# Patient Record
Sex: FEMALE | Race: WHITE | NOT HISPANIC OR LATINO
[De-identification: names, ages, dates, MRNs, and addresses within clinical notes are randomized per-mention and may not be internally consistent; named-entity substitution may affect disease eponyms.]

---

## 2018-02-15 PROBLEM — Z00.00 ENCOUNTER FOR PREVENTIVE HEALTH EXAMINATION: Status: ACTIVE | Noted: 2018-02-15

## 2018-03-20 ENCOUNTER — NON-APPOINTMENT (OUTPATIENT)
Age: 53
End: 2018-03-20

## 2018-03-20 ENCOUNTER — APPOINTMENT (OUTPATIENT)
Dept: ELECTROPHYSIOLOGY | Facility: CLINIC | Age: 53
End: 2018-03-20
Payer: COMMERCIAL

## 2018-03-20 VITALS
SYSTOLIC BLOOD PRESSURE: 104 MMHG | BODY MASS INDEX: 25.76 KG/M2 | OXYGEN SATURATION: 94 % | DIASTOLIC BLOOD PRESSURE: 71 MMHG | WEIGHT: 170 LBS | HEART RATE: 67 BPM | HEIGHT: 68 IN

## 2018-03-20 DIAGNOSIS — Z82.49 FAMILY HISTORY OF ISCHEMIC HEART DISEASE AND OTHER DISEASES OF THE CIRCULATORY SYSTEM: ICD-10-CM

## 2018-03-20 DIAGNOSIS — Z87.891 PERSONAL HISTORY OF NICOTINE DEPENDENCE: ICD-10-CM

## 2018-03-20 DIAGNOSIS — Z87.74 PERSONAL HISTORY OF (CORRECTED) CONGENITAL MALFORMATIONS OF HEART AND CIRCULATORY SYSTEM: ICD-10-CM

## 2018-03-20 DIAGNOSIS — I51.7 CARDIOMEGALY: ICD-10-CM

## 2018-03-20 PROCEDURE — 99244 OFF/OP CNSLTJ NEW/EST MOD 40: CPT

## 2018-03-20 PROCEDURE — 93000 ELECTROCARDIOGRAM COMPLETE: CPT

## 2018-03-27 ENCOUNTER — RX RENEWAL (OUTPATIENT)
Age: 53
End: 2018-03-27

## 2018-03-29 ENCOUNTER — RESULT REVIEW (OUTPATIENT)
Age: 53
End: 2018-03-29

## 2018-05-08 ENCOUNTER — APPOINTMENT (OUTPATIENT)
Dept: ELECTROPHYSIOLOGY | Facility: CLINIC | Age: 53
End: 2018-05-08
Payer: COMMERCIAL

## 2018-05-08 LAB
ALBUMIN SERPL ELPH-MCNC: 4.1 G/DL
ALP BLD-CCNC: 53 U/L
ALT SERPL-CCNC: 24 U/L
ANION GAP SERPL CALC-SCNC: 12 MMOL/L
APTT BLD: 35.6 SEC
AST SERPL-CCNC: 20 U/L
BASOPHILS # BLD AUTO: 0.02 K/UL
BASOPHILS NFR BLD AUTO: 0.4 %
BILIRUB SERPL-MCNC: 0.9 MG/DL
BUN SERPL-MCNC: 15 MG/DL
CALCIUM SERPL-MCNC: 9.6 MG/DL
CHLORIDE SERPL-SCNC: 105 MMOL/L
CO2 SERPL-SCNC: 23 MMOL/L
CREAT SERPL-MCNC: 0.79 MG/DL
EOSINOPHIL # BLD AUTO: 0.11 K/UL
EOSINOPHIL NFR BLD AUTO: 2 %
HCT VFR BLD CALC: 40.1 %
HGB BLD-MCNC: 13 G/DL
IMM GRANULOCYTES NFR BLD AUTO: 0.9 %
INR PPP: 1.44 RATIO
LYMPHOCYTES # BLD AUTO: 1.38 K/UL
LYMPHOCYTES NFR BLD AUTO: 25.4 %
MAN DIFF?: NORMAL
MCHC RBC-ENTMCNC: 30 PG
MCHC RBC-ENTMCNC: 32.4 GM/DL
MCV RBC AUTO: 92.6 FL
MONOCYTES # BLD AUTO: 0.48 K/UL
MONOCYTES NFR BLD AUTO: 8.8 %
NEUTROPHILS # BLD AUTO: 3.4 K/UL
NEUTROPHILS NFR BLD AUTO: 62.5 %
PLATELET # BLD AUTO: 213 K/UL
POTASSIUM SERPL-SCNC: 4.1 MMOL/L
PROT SERPL-MCNC: 6.8 G/DL
PT BLD: 16.4 SEC
RBC # BLD: 4.33 M/UL
RBC # FLD: 13.7 %
SODIUM SERPL-SCNC: 140 MMOL/L
WBC # FLD AUTO: 5.44 K/UL

## 2018-05-08 PROCEDURE — 36415 COLL VENOUS BLD VENIPUNCTURE: CPT

## 2018-05-17 ENCOUNTER — INPATIENT (INPATIENT)
Facility: HOSPITAL | Age: 53
LOS: 0 days | Discharge: HOME | End: 2018-05-18
Attending: INTERNAL MEDICINE | Admitting: INTERNAL MEDICINE
Payer: COMMERCIAL

## 2018-05-17 VITALS
HEART RATE: 61 BPM | WEIGHT: 184.53 LBS | RESPIRATION RATE: 17 BRPM | HEIGHT: 67 IN | DIASTOLIC BLOOD PRESSURE: 55 MMHG | OXYGEN SATURATION: 99 % | SYSTOLIC BLOOD PRESSURE: 90 MMHG

## 2018-05-17 DIAGNOSIS — Z29.9 ENCOUNTER FOR PROPHYLACTIC MEASURES, UNSPECIFIED: ICD-10-CM

## 2018-05-17 DIAGNOSIS — M50.20 OTHER CERVICAL DISC DISPLACEMENT, UNSPECIFIED CERVICAL REGION: ICD-10-CM

## 2018-05-17 DIAGNOSIS — Z95.0 PRESENCE OF CARDIAC PACEMAKER: Chronic | ICD-10-CM

## 2018-05-17 DIAGNOSIS — I48.0 PAROXYSMAL ATRIAL FIBRILLATION: ICD-10-CM

## 2018-05-17 DIAGNOSIS — D64.9 ANEMIA, UNSPECIFIED: ICD-10-CM

## 2018-05-17 DIAGNOSIS — I48.91 UNSPECIFIED ATRIAL FIBRILLATION: ICD-10-CM

## 2018-05-17 DIAGNOSIS — Z98.890 OTHER SPECIFIED POSTPROCEDURAL STATES: Chronic | ICD-10-CM

## 2018-05-17 LAB
APTT BLD: 64.3 SEC — HIGH (ref 27.5–37.4)
BASE EXCESS BLDA CALC-SCNC: -2.4 MMOL/L — SIGNIFICANT CHANGE UP
BLD GP AB SCN SERPL QL: NEGATIVE — SIGNIFICANT CHANGE UP
BUN SERPL-MCNC: 14 MG/DL — SIGNIFICANT CHANGE UP (ref 7–23)
CA-I BLDA-SCNC: 1.11 MMOL/L — LOW (ref 1.15–1.29)
CALCIUM SERPL-MCNC: 8.2 MG/DL — LOW (ref 8.4–10.5)
CHLORIDE SERPL-SCNC: 106 MMOL/L — SIGNIFICANT CHANGE UP (ref 98–107)
CO2 SERPL-SCNC: 23 MMOL/L — SIGNIFICANT CHANGE UP (ref 22–31)
CREAT SERPL-MCNC: 0.74 MG/DL — SIGNIFICANT CHANGE UP (ref 0.5–1.3)
GLUCOSE BLDA-MCNC: 144 MG/DL — HIGH (ref 70–99)
GLUCOSE SERPL-MCNC: 153 MG/DL — HIGH (ref 70–99)
HCG UR QL: NEGATIVE — SIGNIFICANT CHANGE UP
HCO3 BLDA-SCNC: 22 MMOL/L — SIGNIFICANT CHANGE UP (ref 22–26)
HCT VFR BLD CALC: 31.3 % — LOW (ref 34.5–45)
HCT VFR BLDA CALC: 33.2 % — LOW (ref 34.5–46.5)
HGB BLD-MCNC: 10.5 G/DL — LOW (ref 11.5–15.5)
HGB BLDA-MCNC: 10.8 G/DL — LOW (ref 11.5–15.5)
INR BLD: 1.1 — SIGNIFICANT CHANGE UP (ref 0.88–1.17)
MCHC RBC-ENTMCNC: 30.8 PG — SIGNIFICANT CHANGE UP (ref 27–34)
MCHC RBC-ENTMCNC: 33.5 % — SIGNIFICANT CHANGE UP (ref 32–36)
MCV RBC AUTO: 91.8 FL — SIGNIFICANT CHANGE UP (ref 80–100)
NRBC # FLD: 0 — SIGNIFICANT CHANGE UP
PCO2 BLDA: 40 MMHG — SIGNIFICANT CHANGE UP (ref 32–48)
PH BLDA: 7.36 PH — SIGNIFICANT CHANGE UP (ref 7.35–7.45)
PLATELET # BLD AUTO: 195 K/UL — SIGNIFICANT CHANGE UP (ref 150–400)
PMV BLD: 9.6 FL — SIGNIFICANT CHANGE UP (ref 7–13)
PO2 BLDA: 251 MMHG — HIGH (ref 83–108)
POTASSIUM BLDA-SCNC: 3.5 MMOL/L — SIGNIFICANT CHANGE UP (ref 3.4–4.5)
POTASSIUM SERPL-MCNC: 3.6 MMOL/L — SIGNIFICANT CHANGE UP (ref 3.5–5.3)
POTASSIUM SERPL-SCNC: 3.6 MMOL/L — SIGNIFICANT CHANGE UP (ref 3.5–5.3)
PROTHROM AB SERPL-ACNC: 12.2 SEC — SIGNIFICANT CHANGE UP (ref 9.8–13.1)
RBC # BLD: 3.41 M/UL — LOW (ref 3.8–5.2)
RBC # FLD: 13.1 % — SIGNIFICANT CHANGE UP (ref 10.3–14.5)
RH IG SCN BLD-IMP: POSITIVE — SIGNIFICANT CHANGE UP
SAO2 % BLDA: 99.5 % — HIGH (ref 95–99)
SODIUM BLDA-SCNC: 139 MMOL/L — SIGNIFICANT CHANGE UP (ref 136–146)
SODIUM SERPL-SCNC: 137 MMOL/L — SIGNIFICANT CHANGE UP (ref 135–145)
WBC # BLD: 10.15 K/UL — SIGNIFICANT CHANGE UP (ref 3.8–10.5)
WBC # FLD AUTO: 10.15 K/UL — SIGNIFICANT CHANGE UP (ref 3.8–10.5)

## 2018-05-17 PROCEDURE — 93653 COMPRE EP EVAL TX SVT: CPT

## 2018-05-17 PROCEDURE — 93623 PRGRMD STIMJ&PACG IV RX NFS: CPT | Mod: 26

## 2018-05-17 PROCEDURE — 93662 INTRACARDIAC ECG (ICE): CPT | Mod: 26

## 2018-05-17 PROCEDURE — 93010 ELECTROCARDIOGRAM REPORT: CPT

## 2018-05-17 RX ORDER — POTASSIUM CHLORIDE 20 MEQ
20 PACKET (EA) ORAL ONCE
Qty: 0 | Refills: 0 | Status: COMPLETED | OUTPATIENT
Start: 2018-05-17 | End: 2018-05-17

## 2018-05-17 RX ORDER — CHLORHEXIDINE GLUCONATE 213 G/1000ML
1 SOLUTION TOPICAL EVERY 12 HOURS
Qty: 0 | Refills: 0 | Status: DISCONTINUED | OUTPATIENT
Start: 2018-05-17 | End: 2018-05-18

## 2018-05-17 RX ORDER — APIXABAN 2.5 MG/1
5 TABLET, FILM COATED ORAL EVERY 12 HOURS
Qty: 0 | Refills: 0 | Status: DISCONTINUED | OUTPATIENT
Start: 2018-05-17 | End: 2018-05-18

## 2018-05-17 RX ORDER — TRANEXAMIC ACID 100 MG/ML
1300 INJECTION, SOLUTION INTRAVENOUS THREE TIMES A DAY
Qty: 0 | Refills: 0 | Status: DISCONTINUED | OUTPATIENT
Start: 2018-05-17 | End: 2018-05-18

## 2018-05-17 RX ORDER — PANTOPRAZOLE SODIUM 20 MG/1
40 TABLET, DELAYED RELEASE ORAL
Qty: 0 | Refills: 0 | Status: DISCONTINUED | OUTPATIENT
Start: 2018-05-17 | End: 2018-05-18

## 2018-05-17 RX ORDER — LORATADINE 10 MG/1
10 TABLET ORAL DAILY
Qty: 0 | Refills: 0 | Status: DISCONTINUED | OUTPATIENT
Start: 2018-05-17 | End: 2018-05-18

## 2018-05-17 RX ORDER — METOPROLOL TARTRATE 50 MG
100 TABLET ORAL DAILY
Qty: 0 | Refills: 0 | Status: DISCONTINUED | OUTPATIENT
Start: 2018-05-17 | End: 2018-05-18

## 2018-05-17 RX ORDER — METOPROLOL TARTRATE 50 MG
100 TABLET ORAL DAILY
Qty: 0 | Refills: 0 | Status: DISCONTINUED | OUTPATIENT
Start: 2018-05-17 | End: 2018-05-17

## 2018-05-17 RX ORDER — FERROUS SULFATE 325(65) MG
325 TABLET ORAL DAILY
Qty: 0 | Refills: 0 | Status: DISCONTINUED | OUTPATIENT
Start: 2018-05-17 | End: 2018-05-18

## 2018-05-17 RX ORDER — GABAPENTIN 400 MG/1
300 CAPSULE ORAL DAILY
Qty: 0 | Refills: 0 | Status: DISCONTINUED | OUTPATIENT
Start: 2018-05-17 | End: 2018-05-18

## 2018-05-17 RX ORDER — SODIUM CHLORIDE 9 MG/ML
3 INJECTION INTRAMUSCULAR; INTRAVENOUS; SUBCUTANEOUS EVERY 8 HOURS
Qty: 0 | Refills: 0 | Status: DISCONTINUED | OUTPATIENT
Start: 2018-05-17 | End: 2018-05-18

## 2018-05-17 RX ADMIN — SODIUM CHLORIDE 3 MILLILITER(S): 9 INJECTION INTRAMUSCULAR; INTRAVENOUS; SUBCUTANEOUS at 23:07

## 2018-05-17 RX ADMIN — Medication 325 MILLIGRAM(S): at 18:09

## 2018-05-17 RX ADMIN — LORATADINE 10 MILLIGRAM(S): 10 TABLET ORAL at 18:09

## 2018-05-17 RX ADMIN — Medication 20 MILLIEQUIVALENT(S): at 23:07

## 2018-05-17 RX ADMIN — GABAPENTIN 300 MILLIGRAM(S): 400 CAPSULE ORAL at 18:09

## 2018-05-17 RX ADMIN — APIXABAN 5 MILLIGRAM(S): 2.5 TABLET, FILM COATED ORAL at 23:07

## 2018-05-17 NOTE — PROGRESS NOTE ADULT - ASSESSMENT
Patient is a 52y old  Female who presents with a chief complaint of an elective ablation. The patient underwent an elective atrial fibrillation and atrial flutter ablation. The patient tolerated the procedure well without complications. She was transferred to the CCU for further monitoring.

## 2018-05-17 NOTE — PROGRESS NOTE ADULT - PROBLEM SELECTOR PLAN 3
- continue iron supplements  - of note, patient is currently on her period, will start her home medication tranexamic acid

## 2018-05-17 NOTE — H&P CARDIOLOGY - HISTORY OF PRESENT ILLNESS
52 y.o. female presents today for elective A. Fib Ablation 52 y.o. female presents today for elective A. Fib Ablation. The patient c/o palpitations aggravate with exertion (bending down). Admits to dizziness with exertion (climbing upstairs). Denies chest pain, SOB. Admits to b/l lower extremities edema, was prescribed diuretics, but did not continue taking then due to b/l lower extremities pain. 52 y.o. female presents today for elective A. Fib Ablation. The patient c/o palpitations aggravate with exertion (bending down). Admits to dizziness with exertion (climbing upstairs). Denies chest pain, SOB. Admits to b/l lower extremities edema, was prescribed diuretics, but did not continue taking then due to b/l lower extremities pain. The patient denies  presyncope, syncope, melena, hematochezia, fever, chills, abdominal pain, N/v/D/C, urinary symptoms. The patient was evaluated by EP, PPM interrogation showed rates up to 400bpm consistent with A. Fib. Due to patient's symptoms and findings of PPM interrogation the patient was recommended to have A. FIb. Ablation. The patient denies any complaints at present except for mild abdominal cramping due to menstruation

## 2018-05-17 NOTE — H&P CARDIOLOGY - PSH
Cardiac pacemaker  placed in 2003, replaced x 4 times, last  in 10/2016, [employer] Cardiac pacemaker  placed in 2003, replaced x 4 times, last  in 10/2016, [employer]  S/P VSD repair  in 1971

## 2018-05-17 NOTE — PROGRESS NOTE ADULT - PROBLEM SELECTOR PLAN 1
- s/p elective atrial fibrillation/atrial flutter ablation on 5/17 without complication   - started on protonix daily  - continue metoprolol   - restart eloquis tonight at 9pm ( 6 hours post procedure)

## 2018-05-17 NOTE — H&P CARDIOLOGY - REVIEW OF SYSTEMS
The patient denies chest pain, SOB, presyncope, syncope, melena, hematochezia, fever, chills, abdominal pain, N/v/D/C, urinary symptoms.  LMP  - at present.

## 2018-05-17 NOTE — PROGRESS NOTE ADULT - SUBJECTIVE AND OBJECTIVE BOX
HPI:  52 y.o. female presents today for elective A. Fib Ablation. The patient c/o palpitations aggravate with exertion (bending down). Admits to dizziness with exertion (climbing upstairs). Denies chest pain, SOB. Admits to b/l lower extremities edema, was prescribed diuretics, but did not continue taking then due to b/l lower extremities pain. The patient denies  presyncope, syncope, melena, hematochezia, fever, chills, abdominal pain, N/v/D/C, urinary symptoms. The patient was evaluated by EP, PPM interrogation showed rates up to 400bpm consistent with A. Fib. Due to patient's symptoms and findings of PPM interrogation the patient was recommended to have A. FIb. Ablation. The patient denies any complaints at present except for mild abdominal cramping due to menstruation (17 May 2018 06:53)      SUBJECTIVE:  Patient is a 52y old  Female who presents with a chief complaint of an elective ablation. The patient underwent an elective atrial fibrillation and atrial flutter ablation. The patient tolerated the procedure well without complications. She was transferred to the CCU for further monitoring.         OBJECTIVE:  Review Of Systems:  Constitutional: [ ] Fever [ ] Chills [ ] Fatigue [ ] Weight change   HEENT: [ ] Blurred vision [ ] Eye Pain [ ] Headache [ ] Runny nose [ ] Sore Throat   Respiratory: [ ] Cough [ ] Wheezing [ ] Shortness of breath  Cardiovascular: [ ] Chest Pain [ ] Palpitations [ ] MORSE [ ] PND [ ] Orthopnea  Gastrointestinal: [ ] Abdominal Pain [ ] Diarrhea [ ] Constipation [ ] Hemorrhoids [ ] Nausea [ ] Vomiting  Genitourinary: [ ] Nocturia [ ] Dysuria [ ] Incontinence  Extremities: [ ] Swelling [ ] Joint Pain  Neurologic: [ ] Focal deficit [ ] Paresthesias [ ] Syncope  Lymphatic: [ ] Swelling [ ] Lymphadenopathy   Skin: [ ] Rash [ ] Ecchymoses [ ] Wounds [ ] Lesions  Psychiatry: [ ] Depression [ ] Suicidal/Homicidal Ideation [ ] Anxiety [ ] Sleep Disturbances  [ ] 10 point review of systems is otherwise negative except as mentioned above            [ ]Unable to obtain    Allergy:  Allergies    No Known Allergies    Intolerances        Medications:  MEDICATIONS  (STANDING):  apixaban 5 milliGRAM(s) Oral every 12 hours  ferrous    sulfate 325 milliGRAM(s) Oral daily  gabapentin 300 milliGRAM(s) Oral daily  loratadine 10 milliGRAM(s) Oral daily  metoprolol succinate  milliGRAM(s) Oral daily  pantoprazole    Tablet 40 milliGRAM(s) Oral before breakfast  sodium chloride 0.9% lock flush 3 milliLiter(s) IV Push every 8 hours    MEDICATIONS  (PRN):      PMH/PSH/FH/SH: [ ] Unchanged    Vitals:  T(C): --  HR: 61 (18 @ 16:44) (61 - 61)  BP: 90/55 (18 @ 16:44) (90/55 - 90/55)  BP(mean): 61 (18 @ 16:44) (61 - 61)  RR: 17 (18 @ 16:44) ( - )  SpO2: 99% (18 @ 16:44) (99% - 99%)  Wt(kg): --  Daily Height in cm: 170.18 (17 May 2018 16:44)    Daily Weight in k.7 (17 May 2018 16:44)  I&O's Summary    17 May 2018 07:01  -  17 May 2018 17:24  --------------------------------------------------------  IN: 0 mL / OUT: 800 mL / NET: -800 mL        ECG:    Echo:    Stress Testing:     Cath:    Imaging:    Interpretation of Telemetry:      Physical Exam:  Appearance: [ ] Normal  [ ] abnormal [ ] NAD   Eyes: [ ] PERRL [ ] EOMI  HENT: [ ] Normal [ ] Abnormal oral muscosa [ ]NC/AT  Cardiovascular: [ ] S1 [ ] S2 [ ] RRR [ ] m/r/g [ ]edema [ ] JVP  Procedural Access Site: [ ]  hematoma [ ] tender to palpation [ ] 2+ pulse [ ] bruit [ ] Ecchymosis  Respiratory: [ ] Clear to auscultation bilaterally  Gastrointestinal: [ ] Soft [ ] tenderness[ ] distension [ ] BS  Musculoskeletal: [ ] clubbing [ ] joint deformity   Neurologic: [ ] Non-focal  Lymphatic: [ ] lymphadenopathy  Psychiatry: [ ] AAOx3  [ ] confused [ ] disoriented [ ] Mood & affect appropriate  Skin: [ ]  rashes [ ] ecchymoses [ ] cyanosis HPI:  52 y.o. female presents today for elective A. Fib Ablation. The patient c/o palpitations aggravate with exertion (bending down). Admits to dizziness with exertion (climbing upstairs). Denies chest pain, SOB. Admits to b/l lower extremities edema, was prescribed diuretics, but did not continue taking then due to b/l lower extremities pain. The patient denies  presyncope, syncope, melena, hematochezia, fever, chills, abdominal pain, N/v/D/C, urinary symptoms. The patient was evaluated by EP, PPM interrogation showed rates up to 400bpm consistent with A. Fib. Due to patient's symptoms and findings of PPM interrogation the patient was recommended to have A. FIb. Ablation. The patient denies any complaints at present except for mild abdominal cramping due to menstruation (17 May 2018 06:53)      SUBJECTIVE:  Patient is a 52y old  Female who presents with a chief complaint of an elective ablation. The patient underwent an elective atrial fibrillation and atrial flutter ablation. The patient tolerated the procedure well without complications. She was transferred to the CCU for further monitoring. When seen, she denied any complaints. She felt tired from the anesthesia. Denied CP or shortness of breath.         OBJECTIVE:  Review Of Systems:  Constitutional: [ ] Fever [ ] Chills [ ] Fatigue [ ] Weight change   HEENT: [ ] Blurred vision [ ] Eye Pain [ ] Headache [ ] Runny nose [ ] Sore Throat   Respiratory: [ ] Cough [ ] Wheezing [ ] Shortness of breath  Cardiovascular: [ ] Chest Pain [ ] Palpitations [ ] MORSE [ ] PND [ ] Orthopnea  Gastrointestinal: [ ] Abdominal Pain [ ] Diarrhea [ ] Constipation [ ] Hemorrhoids [ ] Nausea [ ] Vomiting  Genitourinary: [ ] Nocturia [ ] Dysuria [ ] Incontinence  Extremities: [ ] Swelling [ ] Joint Pain  Neurologic: [ ] Focal deficit [ ] Paresthesias [ ] Syncope  Lymphatic: [ ] Swelling [ ] Lymphadenopathy   Skin: [ ] Rash [ ] Ecchymoses [ ] Wounds [ ] Lesions  Psychiatry: [ ] Depression [ ] Suicidal/Homicidal Ideation [ ] Anxiety [ ] Sleep Disturbances  [X ] 10 point review of systems is otherwise negative except as mentioned above            [ ]Unable to obtain    Allergy:  Allergies    No Known Allergies    Intolerances        Medications:  MEDICATIONS  (STANDING):  apixaban 5 milliGRAM(s) Oral every 12 hours  ferrous    sulfate 325 milliGRAM(s) Oral daily  gabapentin 300 milliGRAM(s) Oral daily  loratadine 10 milliGRAM(s) Oral daily  metoprolol succinate  milliGRAM(s) Oral daily  pantoprazole    Tablet 40 milliGRAM(s) Oral before breakfast  sodium chloride 0.9% lock flush 3 milliLiter(s) IV Push every 8 hours    MEDICATIONS  (PRN):      PMH/PSH/FH/SH: [X ] Unchanged    Vitals:  T(C): --  HR: 61 (18 @ 16:44) (61 - 61)  BP: 90/55 (18 @ 16:44) (90/55 - 90/55)  BP(mean): 61 (18 @ 16:44) (61 - 61)  RR: 17 (18 @ 16:44) (17 - 17)  SpO2: 99% (18 @ 16:44) (99% - 99%)  Wt(kg): --  Daily Height in cm: 170.18 (17 May 2018 16:44)    Daily Weight in k.7 (17 May 2018 16:44)  I&O's Summary    17 May 2018 07:01  -  17 May 2018 17:24  --------------------------------------------------------  IN: 0 mL / OUT: 800 mL / NET: -800 mL        ECG:    Echo:    Stress Testing:     Cath:    Imaging:    Interpretation of Telemetry:      Physical Exam:  Appearance: [ ] Normal  [ ] abnormal [ ] NAD   Eyes: [ ] PERRL [ ] EOMI  HENT: [ ] Normal [ ] Abnormal oral muscosa [ ]NC/AT  Cardiovascular: [ ] S1 [ ] S2 [ ] RRR [ ] m/r/g [ ]edema [ ] JVP  Procedural Access Site: [ ]  hematoma [ ] tender to palpation [ ] 2+ pulse [ ] bruit [ ] Ecchymosis  Respiratory: [ ] Clear to auscultation bilaterally  Gastrointestinal: [ ] Soft [ ] tenderness[ ] distension [ ] BS  Musculoskeletal: [ ] clubbing [ ] joint deformity   Neurologic: [ ] Non-focal  Lymphatic: [ ] lymphadenopathy  Psychiatry: [ ] AAOx3  [ ] confused [ ] disoriented [ ] Mood & affect appropriate  Skin: [ ]  rashes [ ] ecchymoses [ ] cyanosis HPI:  52 y.o. female presents today for elective A. Fib Ablation. The patient c/o palpitations aggravate with exertion (bending down). Admits to dizziness with exertion (climbing upstairs). Denies chest pain, SOB. Admits to b/l lower extremities edema, was prescribed diuretics, but did not continue taking then due to b/l lower extremities pain. The patient denies  presyncope, syncope, melena, hematochezia, fever, chills, abdominal pain, N/v/D/C, urinary symptoms. The patient was evaluated by EP, PPM interrogation showed rates up to 400bpm consistent with A. Fib. Due to patient's symptoms and findings of PPM interrogation the patient was recommended to have A. FIb. Ablation. The patient denies any complaints at present except for mild abdominal cramping due to menstruation (17 May 2018 06:53)      SUBJECTIVE:  Patient is a 52y old  Female who presents with a chief complaint of an elective ablation. The patient underwent an elective atrial fibrillation and atrial flutter ablation. The patient tolerated the procedure well without complications. She was transferred to the CCU for further monitoring. When seen, she denied any complaints. She felt tired from the anesthesia. Denied CP or shortness of breath.         OBJECTIVE:  Review Of Systems:  Constitutional: [ ] Fever [ ] Chills [ ] Fatigue [ ] Weight change   HEENT: [ ] Blurred vision [ ] Eye Pain [ ] Headache [ ] Runny nose [ ] Sore Throat   Respiratory: [ ] Cough [ ] Wheezing [ ] Shortness of breath  Cardiovascular: [ ] Chest Pain [ ] Palpitations [ ] MORSE [ ] PND [ ] Orthopnea  Gastrointestinal: [ ] Abdominal Pain [ ] Diarrhea [ ] Constipation [ ] Hemorrhoids [ ] Nausea [ ] Vomiting  Genitourinary: [ ] Nocturia [ ] Dysuria [ ] Incontinence  Extremities: [ ] Swelling [ ] Joint Pain  Neurologic: [ ] Focal deficit [ ] Paresthesias [ ] Syncope  Lymphatic: [ ] Swelling [ ] Lymphadenopathy   Skin: [ ] Rash [ ] Ecchymoses [ ] Wounds [ ] Lesions  Psychiatry: [ ] Depression [ ] Suicidal/Homicidal Ideation [ ] Anxiety [ ] Sleep Disturbances  [X ] 10 point review of systems is otherwise negative except as mentioned above            [ ]Unable to obtain    Allergy:  Allergies    No Known Allergies    Intolerances        Medications:  MEDICATIONS  (STANDING):  apixaban 5 milliGRAM(s) Oral every 12 hours  ferrous    sulfate 325 milliGRAM(s) Oral daily  gabapentin 300 milliGRAM(s) Oral daily  loratadine 10 milliGRAM(s) Oral daily  metoprolol succinate  milliGRAM(s) Oral daily  pantoprazole    Tablet 40 milliGRAM(s) Oral before breakfast  sodium chloride 0.9% lock flush 3 milliLiter(s) IV Push every 8 hours    MEDICATIONS  (PRN):      PMH/PSH/FH/SH: [X ] Unchanged    Vitals:  T(C): --  HR: 61 (18 @ 16:44) (61 - 61)  BP: 90/55 (18 @ 16:44) (90/55 - 90/55)  BP(mean): 61 (18 @ 16:44) (61 - 61)  RR: 17 (18 @ 16:44) (17 - 17)  SpO2: 99% (18 @ 16:44) (99% - 99%)  Wt(kg): --  Daily Height in cm: 170.18 (17 May 2018 16:44)    Daily Weight in k.7 (17 May 2018 16:44)  I&O's Summary    17 May 2018 07:01  -  17 May 2018 17:24  --------------------------------------------------------  IN: 0 mL / OUT: 800 mL / NET: -800 mL            Physical Exam:  Appearance: [X ] Normal  [ ] abnormal [ ] NAD   Eyes: [ X] PERRL [ X] EOMI  HENT: [X ] Normal [ ] Abnormal oral muscosa [ ]NC/AT  Cardiovascular: [X ] S1 [X ] S2 [X ] RRR [ ] m/r/g [ ]edema [ ] JVP  Procedural Access Site: [ ]  hematoma [ ] tender to palpation [ ] 2+ pulse [ ] bruit [ ] Ecchymosis  Respiratory: [X ] Clear to auscultation bilaterally  Gastrointestinal: [X ] Soft [ ] tenderness[ ] distension [X ] BS  Musculoskeletal: [ ] clubbing [ ] joint deformity   Neurologic: [X ] Non-focal  Lymphatic: [ ] lymphadenopathy  Psychiatry: [X ] AAOx3  [ ] confused [ ] disoriented [ ] Mood & affect appropriate  Skin: [ ]  rashes [ ] ecchymoses [ ] cyanosis

## 2018-05-17 NOTE — H&P CARDIOLOGY - PMH
<<-----Click on this checkbox to enter Past Medical History Anemia, unspecified type    Atrial fibrillation, unspecified type  diagnosed 14 years ago, on Eliquis, had ablation in 2003, DCCV in 2003  Blood clot in vein  behind PPM ??? diagnosed in 2016, was on Eliquis  Hemorrhoids, unspecified hemorrhoid type    Herniated disc, cervical    Irritable bowel syndrome, unspecified type    Lumbosacral disc disease    Pacemaker  placed in 2003, replaced x 4 times, last  in 10/2016, [employer]  Pulmonary HTN    VSD (ventricular septal defect)  treated surgically in 1971 Anemia, unspecified type    Atrial fibrillation, unspecified type  diagnosed 14 years ago, on Eliquis, had ablation in 2003, DCCV in 2003  Blood clot in vein  behind PPM ??? diagnosed in 2016, was on Eliquis  Hemorrhoids, unspecified hemorrhoid type    Herniated disc, cervical    Irritable bowel syndrome, unspecified type    Lumbosacral disc disease    Pacemaker  placed in 2003, replaced x 4 times, last  in 10/2016, [employer]  Pulmonary HTN    Raynaud's phenomenon without gangrene    VSD (ventricular septal defect)  treated surgically in 1971

## 2018-05-18 ENCOUNTER — TRANSCRIPTION ENCOUNTER (OUTPATIENT)
Age: 53
End: 2018-05-18

## 2018-05-18 VITALS — RESPIRATION RATE: 22 BRPM | HEART RATE: 63 BPM | DIASTOLIC BLOOD PRESSURE: 57 MMHG | SYSTOLIC BLOOD PRESSURE: 102 MMHG

## 2018-05-18 PROBLEM — I48.91 UNSPECIFIED ATRIAL FIBRILLATION: Chronic | Status: ACTIVE | Noted: 2018-05-17

## 2018-05-18 PROBLEM — K58.9 IRRITABLE BOWEL SYNDROME WITHOUT DIARRHEA: Chronic | Status: ACTIVE | Noted: 2018-05-17

## 2018-05-18 PROBLEM — I82.90 ACUTE EMBOLISM AND THROMBOSIS OF UNSPECIFIED VEIN: Chronic | Status: ACTIVE | Noted: 2018-05-17

## 2018-05-18 PROBLEM — M50.20 OTHER CERVICAL DISC DISPLACEMENT, UNSPECIFIED CERVICAL REGION: Chronic | Status: ACTIVE | Noted: 2018-05-17

## 2018-05-18 PROBLEM — I27.20 PULMONARY HYPERTENSION, UNSPECIFIED: Chronic | Status: ACTIVE | Noted: 2018-05-17

## 2018-05-18 PROBLEM — Q21.0 VENTRICULAR SEPTAL DEFECT: Chronic | Status: ACTIVE | Noted: 2018-05-17

## 2018-05-18 PROBLEM — K64.9 UNSPECIFIED HEMORRHOIDS: Chronic | Status: ACTIVE | Noted: 2018-05-17

## 2018-05-18 PROBLEM — Z95.0 PRESENCE OF CARDIAC PACEMAKER: Chronic | Status: ACTIVE | Noted: 2018-05-17

## 2018-05-18 PROBLEM — M51.9 UNSPECIFIED THORACIC, THORACOLUMBAR AND LUMBOSACRAL INTERVERTEBRAL DISC DISORDER: Chronic | Status: ACTIVE | Noted: 2018-05-17

## 2018-05-18 LAB
BUN SERPL-MCNC: 16 MG/DL — SIGNIFICANT CHANGE UP (ref 7–23)
CALCIUM SERPL-MCNC: 8.4 MG/DL — SIGNIFICANT CHANGE UP (ref 8.4–10.5)
CHLORIDE SERPL-SCNC: 105 MMOL/L — SIGNIFICANT CHANGE UP (ref 98–107)
CO2 SERPL-SCNC: 23 MMOL/L — SIGNIFICANT CHANGE UP (ref 22–31)
CREAT SERPL-MCNC: 0.78 MG/DL — SIGNIFICANT CHANGE UP (ref 0.5–1.3)
GLUCOSE SERPL-MCNC: 171 MG/DL — HIGH (ref 70–99)
HCT VFR BLD CALC: 29.2 % — LOW (ref 34.5–45)
HGB BLD-MCNC: 9.5 G/DL — LOW (ref 11.5–15.5)
MAGNESIUM SERPL-MCNC: 2.1 MG/DL — SIGNIFICANT CHANGE UP (ref 1.6–2.6)
MCHC RBC-ENTMCNC: 30.3 PG — SIGNIFICANT CHANGE UP (ref 27–34)
MCHC RBC-ENTMCNC: 32.5 % — SIGNIFICANT CHANGE UP (ref 32–36)
MCV RBC AUTO: 93 FL — SIGNIFICANT CHANGE UP (ref 80–100)
NRBC # FLD: 0 — SIGNIFICANT CHANGE UP
PHOSPHATE SERPL-MCNC: 2.1 MG/DL — LOW (ref 2.5–4.5)
PLATELET # BLD AUTO: 168 K/UL — SIGNIFICANT CHANGE UP (ref 150–400)
PMV BLD: 9.9 FL — SIGNIFICANT CHANGE UP (ref 7–13)
POTASSIUM SERPL-MCNC: 4.4 MMOL/L — SIGNIFICANT CHANGE UP (ref 3.5–5.3)
POTASSIUM SERPL-SCNC: 4.4 MMOL/L — SIGNIFICANT CHANGE UP (ref 3.5–5.3)
RBC # BLD: 3.14 M/UL — LOW (ref 3.8–5.2)
RBC # FLD: 13.2 % — SIGNIFICANT CHANGE UP (ref 10.3–14.5)
SODIUM SERPL-SCNC: 139 MMOL/L — SIGNIFICANT CHANGE UP (ref 135–145)
WBC # BLD: 9.94 K/UL — SIGNIFICANT CHANGE UP (ref 3.8–10.5)
WBC # FLD AUTO: 9.94 K/UL — SIGNIFICANT CHANGE UP (ref 3.8–10.5)

## 2018-05-18 RX ORDER — CHLORHEXIDINE GLUCONATE 213 G/1000ML
1 SOLUTION TOPICAL
Qty: 0 | Refills: 0 | Status: DISCONTINUED | OUTPATIENT
Start: 2018-05-18 | End: 2018-05-18

## 2018-05-18 RX ORDER — PANTOPRAZOLE SODIUM 20 MG/1
1 TABLET, DELAYED RELEASE ORAL
Qty: 30 | Refills: 0 | OUTPATIENT
Start: 2018-05-18 | End: 2018-06-16

## 2018-05-18 RX ORDER — LORATADINE 10 MG/1
10 TABLET ORAL DAILY
Qty: 0 | Refills: 0 | Status: DISCONTINUED | OUTPATIENT
Start: 2018-05-18 | End: 2018-05-18

## 2018-05-18 RX ADMIN — Medication 325 MILLIGRAM(S): at 11:49

## 2018-05-18 RX ADMIN — GABAPENTIN 300 MILLIGRAM(S): 400 CAPSULE ORAL at 11:49

## 2018-05-18 RX ADMIN — PANTOPRAZOLE SODIUM 40 MILLIGRAM(S): 20 TABLET, DELAYED RELEASE ORAL at 05:15

## 2018-05-18 RX ADMIN — Medication 100 MILLIGRAM(S): at 05:15

## 2018-05-18 RX ADMIN — APIXABAN 5 MILLIGRAM(S): 2.5 TABLET, FILM COATED ORAL at 11:49

## 2018-05-18 RX ADMIN — SODIUM CHLORIDE 3 MILLILITER(S): 9 INJECTION INTRAMUSCULAR; INTRAVENOUS; SUBCUTANEOUS at 05:10

## 2018-05-18 NOTE — PROGRESS NOTE ADULT - SUBJECTIVE AND OBJECTIVE BOX
Note Incomplete  --- Pending Attending Rounds      Briefly: 52 y.o. female   - hx: Atrial fibrillation, PPM, corrected VSD (congenital corrected at ~7 year old), Raynaud's, Pulm HTN  - p/f elective A. Fib Ablation.   - 5/17: elective ablation performed. Took several hours 2/2 complexity of case. Transferred to CCU for monitoring  Interval Events: No acute events  Subjective: Slept poorly overnight, "I'll sleep when I get home." Mentions that her finger "fluttered" when given her metoprolol this AM but never had similar experience at home.    Physical:  ICU Vital Signs Last 24 Hrs  T(C): 36.6 (18 May 2018 04:00), Max: 36.7 (17 May 2018 17:30)  T(F): 97.8 (18 May 2018 04:00), Max: 98.1 (17 May 2018 17:30)  HR: 63 (18 May 2018 05:00) (60 - 74)  BP: 94/51 (18 May 2018 05:00) (90/52 - 107/86)  BP(mean): 61 (18 May 2018 05:00) (58 - 91)  ABP: 84/39 (17 May 2018 18:00) (83/40 - 90/46)  ABP(mean): -14 (17 May 2018 21:00) (-14 - 62)  RR: 17 (18 May 2018 05:00) (11 - 25)  SpO2: 90% (18 May 2018 05:00) (90% - 99%)      Telemetry:   EKG:    Exam:      LABS:  CAPILLARY BLOOD GLUCOSE        I&O's Summary    17 May 2018 07:01  -  18 May 2018 06:31  --------------------------------------------------------  IN: 200 mL / OUT: 900 mL / NET: -700 mL                              9.5    9.94  )-----------( 168      ( 18 May 2018 05:00 )             29.2     WBC Trend: 9.94<--, 10.15<--  05-18    139  |  105  |  16  ----------------------------<  171<H>  4.4   |  23  |  0.78    Ca    8.4      18 May 2018 05:00  Phos  2.1     05-18  Mg     2.1     05-18      Creatinine Trend: 0.78<--, 0.74<--  PT/INR - ( 17 May 2018 17:30 )   PT: 12.2 SEC;   INR: 1.10          PTT - ( 17 May 2018 17:30 )  PTT:64.3 SEC        MEDICATIONS  (STANDING):  apixaban 5 milliGRAM(s) Oral every 12 hours  chlorhexidine 4% Liquid 1 Application(s) Topical <User Schedule>  ferrous    sulfate 325 milliGRAM(s) Oral daily  gabapentin 300 milliGRAM(s) Oral daily  loratadine 10 milliGRAM(s) Oral daily  metoprolol succinate  milliGRAM(s) Oral daily  pantoprazole    Tablet 40 milliGRAM(s) Oral before breakfast  sodium chloride 0.9% lock flush 3 milliLiter(s) IV Push every 8 hours    MEDICATIONS  (PRN):  tranexamic  acid 1300 milliGRAM(s) Oral three times a day PRN menorrhagia Briefly: 52 y.o. female   - hx: Atrial fibrillation, PPM, corrected VSD (congenital corrected at ~7 year old), Raynaud's, Pulm HTN  - p/f elective A. Fib Ablation.   - 5/17: elective ablation performed. Took several hours 2/2 complexity of case. Transferred to CCU for monitoring  Interval Events: No acute events  Subjective: Slept poorly overnight, "I'll sleep when I get home." Mentions that her finger "fluttered" when given her metoprolol this AM but never had similar experience at home.    Physical:  ICU Vital Signs Last 24 Hrs  T(C): 36.6 (18 May 2018 04:00), Max: 36.7 (17 May 2018 17:30)  T(F): 97.8 (18 May 2018 04:00), Max: 98.1 (17 May 2018 17:30)  HR: 63 (18 May 2018 05:00) (60 - 74)  BP: 94/51 (18 May 2018 05:00) (90/52 - 107/86)  BP(mean): 61 (18 May 2018 05:00) (58 - 91)  ABP: 84/39 (17 May 2018 18:00) (83/40 - 90/46)  ABP(mean): -14 (17 May 2018 21:00) (-14 - 62)  RR: 17 (18 May 2018 05:00) (11 - 25)  SpO2: 90% (18 May 2018 05:00) (90% - 99%)      Telemetry:   EKG:    Exam:      LABS:  CAPILLARY BLOOD GLUCOSE        I&O's Summary    17 May 2018 07:01  -  18 May 2018 06:31  --------------------------------------------------------  IN: 200 mL / OUT: 900 mL / NET: -700 mL                              9.5    9.94  )-----------( 168      ( 18 May 2018 05:00 )             29.2     WBC Trend: 9.94<--, 10.15<--  05-18    139  |  105  |  16  ----------------------------<  171<H>  4.4   |  23  |  0.78    Ca    8.4      18 May 2018 05:00  Phos  2.1     05-18  Mg     2.1     05-18      Creatinine Trend: 0.78<--, 0.74<--  PT/INR - ( 17 May 2018 17:30 )   PT: 12.2 SEC;   INR: 1.10          PTT - ( 17 May 2018 17:30 )  PTT:64.3 SEC        MEDICATIONS  (STANDING):  apixaban 5 milliGRAM(s) Oral every 12 hours  chlorhexidine 4% Liquid 1 Application(s) Topical <User Schedule>  ferrous    sulfate 325 milliGRAM(s) Oral daily  gabapentin 300 milliGRAM(s) Oral daily  loratadine 10 milliGRAM(s) Oral daily  metoprolol succinate  milliGRAM(s) Oral daily  pantoprazole    Tablet 40 milliGRAM(s) Oral before breakfast  sodium chloride 0.9% lock flush 3 milliLiter(s) IV Push every 8 hours    MEDICATIONS  (PRN):  tranexamic  acid 1300 milliGRAM(s) Oral three times a day PRN menorrhagia Briefly: 52 y.o. female   - hx: Atrial fibrillation, PPM, corrected VSD (congenital corrected at ~7 year old), Raynaud's, Pulm HTN  - p/f elective A. Fib Ablation.   - 5/17: elective ablation performed. Took several hours 2/2 complexity of case. Transferred to CCU for monitoring  Interval Events: No acute events  Subjective: Slept poorly overnight, "I'll sleep when I get home." Mentions that her finger "fluttered" when given her metoprolol this AM but never had similar experience at home.    Physical:  ICU Vital Signs Last 24 Hrs  T(C): 36.6 (18 May 2018 04:00), Max: 36.7 (17 May 2018 17:30)  T(F): 97.8 (18 May 2018 04:00), Max: 98.1 (17 May 2018 17:30)  HR: 63 (18 May 2018 05:00) (60 - 74)  BP: 94/51 (18 May 2018 05:00) (90/52 - 107/86)  BP(mean): 61 (18 May 2018 05:00) (58 - 91)  ABP: 84/39 (17 May 2018 18:00) (83/40 - 90/46)  ABP(mean): -14 (17 May 2018 21:00) (-14 - 62)  RR: 17 (18 May 2018 05:00) (11 - 25)  SpO2: 90% (18 May 2018 05:00) (90% - 99%)    Telemetry: Atrial paces 60s    Exam:  General: NAD, well-developed  Head:  Atraumatic, Normocephalic  Eyes: EOMI, PERRLA, conjunctiva and sclera clear  Chest/Lung: Clear to auscultation bilaterally; No wheeze  Heart: Regular rate and rhythm; No murmurs, rubs, or gallops  Abdomen: Soft, Nontender, Nondistended; Bowel sounds present  Extremities:  2+ Peripheral Pulses, No clubbing, cyanosis, or edema  Psych: Alert, oriented, appropriate  Neurology: non-focal  Skin: No rashes or lesions    LABS:  CAPILLARY BLOOD GLUCOSE        I&O's Summary    17 May 2018 07:01  -  18 May 2018 06:31  --------------------------------------------------------  IN: 200 mL / OUT: 900 mL / NET: -700 mL                              9.5    9.94  )-----------( 168      ( 18 May 2018 05:00 )             29.2     WBC Trend: 9.94<--, 10.15<--  05-18    139  |  105  |  16  ----------------------------<  171<H>  4.4   |  23  |  0.78    Ca    8.4      18 May 2018 05:00  Phos  2.1     05-18  Mg     2.1     05-18      Creatinine Trend: 0.78<--, 0.74<--  PT/INR - ( 17 May 2018 17:30 )   PT: 12.2 SEC;   INR: 1.10          PTT - ( 17 May 2018 17:30 )  PTT:64.3 SEC        MEDICATIONS  (STANDING):  apixaban 5 milliGRAM(s) Oral every 12 hours  chlorhexidine 4% Liquid 1 Application(s) Topical <User Schedule>  ferrous    sulfate 325 milliGRAM(s) Oral daily  gabapentin 300 milliGRAM(s) Oral daily  loratadine 10 milliGRAM(s) Oral daily  metoprolol succinate  milliGRAM(s) Oral daily  pantoprazole    Tablet 40 milliGRAM(s) Oral before breakfast  sodium chloride 0.9% lock flush 3 milliLiter(s) IV Push every 8 hours    MEDICATIONS  (PRN):  tranexamic  acid 1300 milliGRAM(s) Oral three times a day PRN menorrhagia Briefly: 52 y.o. female   - hx: Atrial fibrillation, PPM, corrected VSD (congenital corrected at ~7 year old), Raynaud's, Pulm HTN  - p/f elective A. Fib Ablation.   - 5/17: elective ablation performed. Took several hours 2/2 complexity of case. Transferred to CCU for monitoring  Interval Events: No acute events  Subjective: Slept poorly overnight, "I'll sleep when I get home." Mentions that her finger "fluttered" when given her metoprolol this AM but never had similar experience at home.    Physical:  ICU Vital Signs Last 24 Hrs  T(C): 36.6 (18 May 2018 04:00), Max: 36.7 (17 May 2018 17:30)  T(F): 97.8 (18 May 2018 04:00), Max: 98.1 (17 May 2018 17:30)  HR: 63 (18 May 2018 05:00) (60 - 74)  BP: 94/51 (18 May 2018 05:00) (90/52 - 107/86)  BP(mean): 61 (18 May 2018 05:00) (58 - 91)  ABP: 84/39 (17 May 2018 18:00) (83/40 - 90/46)  ABP(mean): -14 (17 May 2018 21:00) (-14 - 62)  RR: 17 (18 May 2018 05:00) (11 - 25)  SpO2: 90% (18 May 2018 05:00) (90% - 99%)    Telemetry: Atrial paces 60s    Exam:  General: NAD, well-developed  Head:  Atraumatic, Normocephalic  Eyes: EOMI, PERRLA, conjunctiva and sclera clear  Chest/Lung: Clear to auscultation bilaterally; No wheeze  Heart: Regular rate and rhythm; No murmurs, rubs, or gallops  Abdomen: Soft, Nontender, Nondistended; Bowel sounds present  Extremities:  2+ Peripheral Pulses, No clubbing, cyanosis, or edema. No hematoma at right groin catheter site.  Psych: Alert, oriented, appropriate  Neurology: non-focal  Skin: No rashes or lesions    LABS:  CAPILLARY BLOOD GLUCOSE        I&O's Summary    17 May 2018 07:01  -  18 May 2018 06:31  --------------------------------------------------------  IN: 200 mL / OUT: 900 mL / NET: -700 mL                              9.5    9.94  )-----------( 168      ( 18 May 2018 05:00 )             29.2     WBC Trend: 9.94<--, 10.15<--  05-18    139  |  105  |  16  ----------------------------<  171<H>  4.4   |  23  |  0.78    Ca    8.4      18 May 2018 05:00  Phos  2.1     05-18  Mg     2.1     05-18      Creatinine Trend: 0.78<--, 0.74<--  PT/INR - ( 17 May 2018 17:30 )   PT: 12.2 SEC;   INR: 1.10          PTT - ( 17 May 2018 17:30 )  PTT:64.3 SEC        MEDICATIONS  (STANDING):  apixaban 5 milliGRAM(s) Oral every 12 hours  chlorhexidine 4% Liquid 1 Application(s) Topical <User Schedule>  ferrous    sulfate 325 milliGRAM(s) Oral daily  gabapentin 300 milliGRAM(s) Oral daily  loratadine 10 milliGRAM(s) Oral daily  metoprolol succinate  milliGRAM(s) Oral daily  pantoprazole    Tablet 40 milliGRAM(s) Oral before breakfast  sodium chloride 0.9% lock flush 3 milliLiter(s) IV Push every 8 hours    MEDICATIONS  (PRN):  tranexamic  acid 1300 milliGRAM(s) Oral three times a day PRN menorrhagia

## 2018-05-18 NOTE — DISCHARGE NOTE ADULT - CARE PLAN
Principal Discharge DX:	Atrial fibrillation, unspecified type  Goal:	Follow-up with Dr. Singh  Assessment and plan of treatment:	You were admitted for elective ablation of your atrial fibrillation. This reduces the likelihood that your heart will enter an abnormal rhythm. Please continue with your home metoprolol and Eliquis to control your rate (should your enter afib again) and prevent clots. Please follow-up with Dr. Singh within the next 2 weeks. Principal Discharge DX:	Atrial fibrillation, unspecified type  Goal:	Follow-up with Dr. Singh  Assessment and plan of treatment:	You were admitted for elective ablation of your atrial fibrillation. This reduces the likelihood that your heart will enter an abnormal rhythm. Please continue with your home metoprolol and Eliquis to control your rate (should your enter afib again) and prevent clots. Please follow-up with Dr. Singh on June 1st at 2 PM.

## 2018-05-18 NOTE — DISCHARGE NOTE ADULT - MEDICATION SUMMARY - MEDICATIONS TO TAKE
I will START or STAY ON the medications listed below when I get home from the hospital:    Eliquis 5 mg oral tablet  -- 1 tab(s) by mouth 2 times a day  -- Indication: For Atrial fibrillation, unspecified type    gabapentin 300 mg oral capsule  -- 1 cap(s) by mouth once a day  -- Indication: For Lumbosacral disc disease    ZyrTEC 10 mg oral tablet  -- 1 tab(s) by mouth once a day  -- Indication: For Allergies    metoprolol succinate 100 mg oral tablet, extended release  -- 1 tab(s) by mouth once a day  -- Indication: For Atrial fibrillation, unspecified type    ferrous sulfate 325 mg (65 mg elemental iron) oral tablet  -- 1 tab(s) by mouth once a day  -- Indication: For Iron Deficiency    tranexamic acid 650 mg oral tablet  -- 2 tab(s) by mouth 3 times a day, As Needed for menorrhagia  -- Indication: For Menorrhagia    biotin  -- Indication: For Supplementation I will START or STAY ON the medications listed below when I get home from the hospital:    Eliquis 5 mg oral tablet  -- 1 tab(s) by mouth 2 times a day  -- Indication: For Atrial fibrillation, unspecified type    gabapentin 300 mg oral capsule  -- 1 cap(s) by mouth once a day  -- Indication: For Lumbosacral disc disease    ZyrTEC 10 mg oral tablet  -- 1 tab(s) by mouth once a day  -- Indication: For Allergies    metoprolol succinate 100 mg oral tablet, extended release  -- 1 tab(s) by mouth once a day  -- Indication: For Atrial fibrillation, unspecified type    ferrous sulfate 325 mg (65 mg elemental iron) oral tablet  -- 1 tab(s) by mouth once a day  -- Indication: For Iron Deficiency    tranexamic acid 650 mg oral tablet  -- 2 tab(s) by mouth 3 times a day, As Needed for menorrhagia  -- Indication: For Menorrhagia    pantoprazole 40 mg oral delayed release tablet  -- 1 tab(s) by mouth once a day (before a meal)  -- Indication: For Ulceration prevention    biotin  -- Indication: For Supplementation

## 2018-05-18 NOTE — DISCHARGE NOTE ADULT - PLAN OF CARE
Follow-up with Dr. Singh You were admitted for elective ablation of your atrial fibrillation. This reduces the likelihood that your heart will enter an abnormal rhythm. Please continue with your home metoprolol and Eliquis to control your rate (should your enter afib again) and prevent clots. Please follow-up with Dr. Singh within the next 2 weeks. You were admitted for elective ablation of your atrial fibrillation. This reduces the likelihood that your heart will enter an abnormal rhythm. Please continue with your home metoprolol and Eliquis to control your rate (should your enter afib again) and prevent clots. Please follow-up with Dr. Singh on June 1st at 2 PM.

## 2018-05-18 NOTE — PROGRESS NOTE ADULT - ASSESSMENT
Patient is a 52y old female with past medical history of VSD repair, previous ablations ? location, dual chamber Medtronic pacemaker for SND and atrial fibrillation/flutter with palpitations and dizziness admitted for elective AFib/flutter ablation. Tolerated the procedure well. No complications. Patient without complaints. No events overnight.  Post procedure ablation teaching done and written instructions and contact information provided.  Labs ok.  To continue home medications. In addition, started on PPI to prevent GERD.   Continue anticoagulation.  Patient stable for discharge.   Has follow-up appointment with Dr. Singh and the device clinic on 6/1/18 at 2:00pm.  Stable for discharge.

## 2018-05-18 NOTE — PROGRESS NOTE ADULT - SUBJECTIVE AND OBJECTIVE BOX
Patient s/p atrial fibrillation/flutter ablation.  Tolerated the procedure well. No complications. Patient denies chest pain, shortness of breath, palpitations or dizziness. No pain, bleeding or swelling of right groin site.    Vital Signs Last 24 Hrs  T(C): 36.6 (18 May 2018 04:00), Max: 36.7 (17 May 2018 17:30)  T(F): 97.8 (18 May 2018 04:00), Max: 98.1 (17 May 2018 17:30)  HR: 63 (18 May 2018 10:00) (60 - 74)  BP: 106/49 (18 May 2018 10:00) (90/52 - 119/56)  BP(mean): 62 (18 May 2018 10:00) (58 - 91)  RR: 22 (18 May 2018 10:00) (11 - 29)  SpO2: 90% (18 May 2018 05:00) (90% - 99%)      EKG:  Iva; sinus rhythm with atrial pacing.   RBBB  Telemetry:  Normal sinus rhythm with atrial pacing    MEDICATIONS  (STANDING):  apixaban 5 milliGRAM(s) Oral every 12 hours  chlorhexidine 4% Liquid 1 Application(s) Topical <User Schedule>  ferrous    sulfate 325 milliGRAM(s) Oral daily  gabapentin 300 milliGRAM(s) Oral daily  loratadine 10 milliGRAM(s) Oral daily  metoprolol succinate  milliGRAM(s) Oral daily  pantoprazole    Tablet 40 milliGRAM(s) Oral before breakfast  sodium chloride 0.9% lock flush 3 milliLiter(s) IV Push every 8 hours    MEDICATIONS  (PRN):  tranexamic  acid 1300 milliGRAM(s) Oral three times a day PRN menorrhagia          Physical exam:   Gen-Well developed. Well nourished.  NAD  Resp- clear to auscultation. No wheezing, rales or rhonchi  CV- Normal S1 and S2. RRR. grade 1/6 systolic murmur  ABD- soft nontender +bowel sounds  EXT- mild edema hands bilaterally. No leg edema. NO calf tenderness. Right groin access site without hematoma, bleeding or ecchymosis  Neuro- grossly nonfocal                            9.5    9.94  )-----------( 168      ( 18 May 2018 05:00 )             29.2     PT/INR - ( 17 May 2018 17:30 )   PT: 12.2 SEC;   INR: 1.10          PTT - ( 17 May 2018 17:30 )  PTT:64.3 SEC  05-18    139  |  105  |  16  ----------------------------<  171<H>  4.4   |  23  |  0.78    Ca    8.4      18 May 2018 05:00  Phos  2.1     05-18  Mg     2.1     05-18

## 2018-05-18 NOTE — DISCHARGE NOTE ADULT - HOSPITAL COURSE
Ms. Bravo is a 52 y.o. female with a history of Atrial fibrillation, PPM, corrected VSD (congenital corrected at ~7 year old), Raynaud's, Pulm HTN who presents for elective A. Fib Ablation. On 5/17,  elective ablation performed. Took several hours 2/2 complexity of case. Transferred to CCU for monitoring. No complications occurred overnight, cath site without hematoma. She was restarted on her home metoprolol and Eliquis She was discharged in stable condition for close cardiac follow-up. Ms. Bravo is a 52 y.o. female with a history of Atrial fibrillation, PPM, corrected VSD (congenital corrected at ~7 year old), Raynaud's, Pulm HTN who presents for elective A. Fib Ablation. On 5/17,  elective ablation performed. The procedure was prolonged as a result of case complexity. Transferred to CCU for monitoring. No complications occurred overnight, Her catheterization site was without hematoma. She was restarted on her home metoprolol and Eliquis She was discharged in stable condition for close cardiac follow-up.

## 2018-05-18 NOTE — DISCHARGE NOTE ADULT - ADDITIONAL INSTRUCTIONS
Please follow-up with Dr. Singh within the next two weeks  Please also follow-up with your Primary care physician within the next month. Please follow-up with Dr. Singh on June 1st at 2 PM.  Please also follow-up with your Primary care physician within the next month.

## 2018-05-18 NOTE — DISCHARGE NOTE ADULT - CARE PROVIDER_API CALL
Rubén Singh (MD), Cardiac Electrophysiology; Cardiovascular Disease; Internal Medicine  [id number] [place] [address]  Phone: [phone]  Fax: [phone]

## 2018-05-18 NOTE — PROGRESS NOTE ADULT - ASSESSMENT
Patient is a 52y old  Female who presents with a chief complaint of an elective ablation. The patient underwent an elective atrial fibrillation and atrial flutter ablation. The patient tolerated the procedure well without complications. She was transferred to the CCU for further monitoring.     # Afib  - s/p elective atrial fibrillation/atrial flutter ablation on 5/17 without complication   - started on protonix daily  - c/w metoprolol   - c/w Eliquis    # Herniated Disc  - c/w home gabapentin    # Anemia  - c/w iron supplements  - Menorrhagia: c/w home medication tranexamic acid    #DVT ppx: c/w xeralto   #Diet: mechanical soft diet    Tee Scott MD PhD

## 2018-05-18 NOTE — DISCHARGE NOTE ADULT - PATIENT PORTAL LINK FT
You can access the [facility] Patient Portal, offered by [facility], by registering with the following website: [email or web address]

## 2018-05-19 ENCOUNTER — INPATIENT (INPATIENT)
Facility: HOSPITAL | Age: 53
LOS: 1 days | Discharge: HOME | End: 2018-05-21
Attending: HOSPITALIST | Admitting: HOSPITALIST
Payer: COMMERCIAL

## 2018-05-19 VITALS
HEART RATE: 66 BPM | TEMPERATURE: 98 F | RESPIRATION RATE: 22 BRPM | OXYGEN SATURATION: 100 % | SYSTOLIC BLOOD PRESSURE: 115 MMHG | DIASTOLIC BLOOD PRESSURE: 65 MMHG

## 2018-05-19 DIAGNOSIS — Z98.890 OTHER SPECIFIED POSTPROCEDURAL STATES: Chronic | ICD-10-CM

## 2018-05-19 DIAGNOSIS — Z95.0 PRESENCE OF CARDIAC PACEMAKER: Chronic | ICD-10-CM

## 2018-05-19 LAB
ALBUMIN SERPL ELPH-MCNC: 3.5 G/DL — SIGNIFICANT CHANGE UP (ref 3.3–5)
ALP SERPL-CCNC: 67 U/L — SIGNIFICANT CHANGE UP (ref 40–120)
ALT FLD-CCNC: 77 U/L — HIGH (ref 4–33)
APTT BLD: 29 SEC — SIGNIFICANT CHANGE UP (ref 27.5–37.4)
AST SERPL-CCNC: 56 U/L — HIGH (ref 4–32)
BASE EXCESS BLDV CALC-SCNC: 4.7 MMOL/L — SIGNIFICANT CHANGE UP
BASOPHILS # BLD AUTO: 0.06 K/UL — SIGNIFICANT CHANGE UP (ref 0–0.2)
BASOPHILS NFR BLD AUTO: 0.7 % — SIGNIFICANT CHANGE UP (ref 0–2)
BASOPHILS NFR SPEC: 1 % — SIGNIFICANT CHANGE UP (ref 0–2)
BILIRUB SERPL-MCNC: 0.3 MG/DL — SIGNIFICANT CHANGE UP (ref 0.2–1.2)
BLD GP AB SCN SERPL QL: NEGATIVE — SIGNIFICANT CHANGE UP
BLOOD GAS VENOUS - CREATININE: 0.64 MG/DL — SIGNIFICANT CHANGE UP (ref 0.5–1.3)
BUN SERPL-MCNC: 16 MG/DL — SIGNIFICANT CHANGE UP (ref 7–23)
CALCIUM SERPL-MCNC: 8.4 MG/DL — SIGNIFICANT CHANGE UP (ref 8.4–10.5)
CHLORIDE BLDV-SCNC: 108 MMOL/L — SIGNIFICANT CHANGE UP (ref 96–108)
CHLORIDE SERPL-SCNC: 105 MMOL/L — SIGNIFICANT CHANGE UP (ref 98–107)
CO2 SERPL-SCNC: 26 MMOL/L — SIGNIFICANT CHANGE UP (ref 22–31)
CREAT SERPL-MCNC: 0.87 MG/DL — SIGNIFICANT CHANGE UP (ref 0.5–1.3)
EOSINOPHIL # BLD AUTO: 0.16 K/UL — SIGNIFICANT CHANGE UP (ref 0–0.5)
EOSINOPHIL NFR BLD AUTO: 1.8 % — SIGNIFICANT CHANGE UP (ref 0–6)
EOSINOPHIL NFR FLD: 0 % — SIGNIFICANT CHANGE UP (ref 0–6)
GAS PNL BLDV: 136 MMOL/L — SIGNIFICANT CHANGE UP (ref 136–146)
GLUCOSE BLDV-MCNC: 95 — SIGNIFICANT CHANGE UP (ref 70–99)
GLUCOSE SERPL-MCNC: 89 MG/DL — SIGNIFICANT CHANGE UP (ref 70–99)
HCG SERPL-ACNC: < 5 MIU/ML — SIGNIFICANT CHANGE UP
HCO3 BLDV-SCNC: 27 MMOL/L — SIGNIFICANT CHANGE UP (ref 20–27)
HCT VFR BLD CALC: 34 % — LOW (ref 34.5–45)
HCT VFR BLDV CALC: 32.6 % — LOW (ref 34.5–45)
HGB BLD-MCNC: 10.9 G/DL — LOW (ref 11.5–15.5)
HGB BLDV-MCNC: 10.6 G/DL — LOW (ref 11.5–15.5)
IMM GRANULOCYTES # BLD AUTO: 0.32 # — SIGNIFICANT CHANGE UP
IMM GRANULOCYTES NFR BLD AUTO: 3.7 % — HIGH (ref 0–1.5)
INR BLD: 1.07 — SIGNIFICANT CHANGE UP (ref 0.88–1.17)
LACTATE BLDV-MCNC: 1 MMOL/L — SIGNIFICANT CHANGE UP (ref 0.5–2)
LIDOCAIN IGE QN: 65.7 U/L — HIGH (ref 7–60)
LYMPHOCYTES # BLD AUTO: 2.01 K/UL — SIGNIFICANT CHANGE UP (ref 1–3.3)
LYMPHOCYTES # BLD AUTO: 23.1 % — SIGNIFICANT CHANGE UP (ref 13–44)
LYMPHOCYTES NFR SPEC AUTO: 20 % — SIGNIFICANT CHANGE UP (ref 13–44)
MANUAL SMEAR VERIFICATION: SIGNIFICANT CHANGE UP
MCHC RBC-ENTMCNC: 30.1 PG — SIGNIFICANT CHANGE UP (ref 27–34)
MCHC RBC-ENTMCNC: 32.1 % — SIGNIFICANT CHANGE UP (ref 32–36)
MCV RBC AUTO: 93.9 FL — SIGNIFICANT CHANGE UP (ref 80–100)
METAMYELOCYTES # FLD: 1 % — SIGNIFICANT CHANGE UP (ref 0–1)
MONOCYTES # BLD AUTO: 0.84 K/UL — SIGNIFICANT CHANGE UP (ref 0–0.9)
MONOCYTES NFR BLD AUTO: 9.6 % — SIGNIFICANT CHANGE UP (ref 2–14)
MONOCYTES NFR BLD: 7 % — SIGNIFICANT CHANGE UP (ref 2–9)
MYELOCYTES NFR BLD: 1 % — HIGH (ref 0–0)
NEUTROPHIL AB SER-ACNC: 67 % — SIGNIFICANT CHANGE UP (ref 43–77)
NEUTROPHILS # BLD AUTO: 5.32 K/UL — SIGNIFICANT CHANGE UP (ref 1.8–7.4)
NEUTROPHILS NFR BLD AUTO: 61.1 % — SIGNIFICANT CHANGE UP (ref 43–77)
NEUTS BAND # BLD: 1 % — SIGNIFICANT CHANGE UP (ref 0–6)
NRBC # BLD: 0 /100WBC — SIGNIFICANT CHANGE UP
NRBC # FLD: 0 — SIGNIFICANT CHANGE UP
PCO2 BLDV: 49 MMHG — SIGNIFICANT CHANGE UP (ref 41–51)
PH BLDV: 7.4 PH — SIGNIFICANT CHANGE UP (ref 7.32–7.43)
PLATELET # BLD AUTO: 217 K/UL — SIGNIFICANT CHANGE UP (ref 150–400)
PLATELET CLUMP BLD QL SMEAR: SIGNIFICANT CHANGE UP
PLATELET COUNT - ESTIMATE: NORMAL — SIGNIFICANT CHANGE UP
PMV BLD: 9.7 FL — SIGNIFICANT CHANGE UP (ref 7–13)
PO2 BLDV: < 24 MMHG — LOW (ref 35–40)
POTASSIUM BLDV-SCNC: 4 MMOL/L — SIGNIFICANT CHANGE UP (ref 3.4–4.5)
POTASSIUM SERPL-MCNC: 3.8 MMOL/L — SIGNIFICANT CHANGE UP (ref 3.5–5.3)
POTASSIUM SERPL-SCNC: 3.8 MMOL/L — SIGNIFICANT CHANGE UP (ref 3.5–5.3)
PROT SERPL-MCNC: 6.2 G/DL — SIGNIFICANT CHANGE UP (ref 6–8.3)
PROTHROM AB SERPL-ACNC: 12.3 SEC — SIGNIFICANT CHANGE UP (ref 9.8–13.1)
RBC # BLD: 3.62 M/UL — LOW (ref 3.8–5.2)
RBC # FLD: 13.6 % — SIGNIFICANT CHANGE UP (ref 10.3–14.5)
REVIEW TO FOLLOW: YES — SIGNIFICANT CHANGE UP
RH IG SCN BLD-IMP: POSITIVE — SIGNIFICANT CHANGE UP
SAO2 % BLDV: 28.1 % — LOW (ref 60–85)
SMUDGE CELLS # BLD: PRESENT — SIGNIFICANT CHANGE UP
SODIUM SERPL-SCNC: 142 MMOL/L — SIGNIFICANT CHANGE UP (ref 135–145)
TROPONIN T SERPL-MCNC: 0.39 NG/ML — HIGH (ref 0–0.06)
VARIANT LYMPHS # BLD: 2 % — SIGNIFICANT CHANGE UP
WBC # BLD: 8.71 K/UL — SIGNIFICANT CHANGE UP (ref 3.8–10.5)
WBC # FLD AUTO: 8.71 K/UL — SIGNIFICANT CHANGE UP (ref 3.8–10.5)

## 2018-05-19 PROCEDURE — 71275 CT ANGIOGRAPHY CHEST: CPT | Mod: 26

## 2018-05-19 PROCEDURE — 71045 X-RAY EXAM CHEST 1 VIEW: CPT | Mod: 26

## 2018-05-19 PROCEDURE — 74174 CTA ABD&PLVS W/CONTRAST: CPT | Mod: 26

## 2018-05-19 PROCEDURE — 76705 ECHO EXAM OF ABDOMEN: CPT | Mod: 26

## 2018-05-19 RX ORDER — SODIUM CHLORIDE 9 MG/ML
1000 INJECTION INTRAMUSCULAR; INTRAVENOUS; SUBCUTANEOUS ONCE
Qty: 0 | Refills: 0 | Status: COMPLETED | OUTPATIENT
Start: 2018-05-19 | End: 2018-05-19

## 2018-05-19 RX ORDER — MORPHINE SULFATE 50 MG/1
4 CAPSULE, EXTENDED RELEASE ORAL ONCE
Qty: 0 | Refills: 0 | Status: DISCONTINUED | OUTPATIENT
Start: 2018-05-19 | End: 2018-05-19

## 2018-05-19 RX ORDER — PIPERACILLIN AND TAZOBACTAM 4; .5 G/20ML; G/20ML
3.38 INJECTION, POWDER, LYOPHILIZED, FOR SOLUTION INTRAVENOUS ONCE
Qty: 0 | Refills: 0 | Status: COMPLETED | OUTPATIENT
Start: 2018-05-19 | End: 2018-05-19

## 2018-05-19 RX ORDER — SODIUM CHLORIDE 9 MG/ML
500 INJECTION INTRAMUSCULAR; INTRAVENOUS; SUBCUTANEOUS ONCE
Qty: 0 | Refills: 0 | Status: COMPLETED | OUTPATIENT
Start: 2018-05-19 | End: 2018-05-19

## 2018-05-19 RX ADMIN — PIPERACILLIN AND TAZOBACTAM 200 GRAM(S): 4; .5 INJECTION, POWDER, LYOPHILIZED, FOR SOLUTION INTRAVENOUS at 21:31

## 2018-05-19 RX ADMIN — SODIUM CHLORIDE 500 MILLILITER(S): 9 INJECTION INTRAMUSCULAR; INTRAVENOUS; SUBCUTANEOUS at 18:05

## 2018-05-19 RX ADMIN — SODIUM CHLORIDE 1000 MILLILITER(S): 9 INJECTION INTRAMUSCULAR; INTRAVENOUS; SUBCUTANEOUS at 21:25

## 2018-05-19 RX ADMIN — MORPHINE SULFATE 4 MILLIGRAM(S): 50 CAPSULE, EXTENDED RELEASE ORAL at 23:41

## 2018-05-19 RX ADMIN — MORPHINE SULFATE 4 MILLIGRAM(S): 50 CAPSULE, EXTENDED RELEASE ORAL at 23:24

## 2018-05-19 NOTE — ED PROVIDER NOTE - PSH
Cardiac pacemaker  placed in 2003, replaced x 4 times, last  in 10/2016, Medtronic  S/P VSD repair  in 1971

## 2018-05-19 NOTE — ED ADULT NURSE NOTE - OBJECTIVE STATEMENT
pt s/p ablation on Thursday for a-fib. Has had mult ablations in the past including one which pt states disrupted her "natural pace maker" requiring her to have a PPM. Ablation on Thursday was d/t high atrial rate despite PPM. Patient reports she is chronically "swollen" possibly s/t right heart enlargement. reports pedal and hand edema yesterday but was DC'd regardless. Today pt reports pressure on her chest and reports that her chest is "swollen" as well. Chest wall is tender to palpation, and is tender to palpation and pt states it is "rigid" however abd is soft. pt reports SOB worsening on exertion and deep breathing. IV 20 g RAC labs sent TQ removed CCM in place a-paced in the 60s.

## 2018-05-19 NOTE — CONSULT NOTE ADULT - SUBJECTIVE AND OBJECTIVE BOX
Date of Admission:    CHIEF COMPLAINT:    HISTORY OF PRESENT ILLNESS: Patient is a 52F with PMH pulm HTN, PPM, s/p afib/flutter ablation POD #2 presents with chest pain and shortness of breath since last night. On physical exam there is +tenderness upper abdominal region and it is rigid.    	    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 diagnosed in 2016, was on Eliquis  Pulmonary HTN  Hemorrhoids, unspecified hemorrhoid type  Pacemaker: placed in 2003, replaced x 4 times, last  in 10/2016, [employer]  VSD (ventricular septal defect): treated surgically in 1971  Anemia, unspecified type  Lumbosacral disc disease  Herniated disc, cervical  Atrial fibrillation, unspecified type: diagnosed 14 years ago, on Eliquis, had ablation in 2003, DCCV in 2003  S/P VSD repair: in 1971  Cardiac pacemaker: placed in 2003, replaced x 4 times, last  in 10/2016, [employer]      FAMILY HISTORY:  Family history of heart attack      SOCIAL HISTORY:    [ ] Non-smoker  [ ] Smoker  [ ] Alcohol      REVIEW OF SYSTEMS:  See HPI. Otherwise, 10 point ROS done and otherwise negative.      T(C): 36.8 (05-19-18 @ 16:14), Max: 36.8 (05-19-18 @ 16:14)  HR: 67 (05-19-18 @ 18:35) (66 - 67)  BP: 123/67 (05-19-18 @ 18:35) (113/85 - 123/67)  RR: 18 (05-19-18 @ 18:35) (18 - 22)  SpO2: 99% (05-19-18 @ 18:35) (99% - 100%)  Wt(kg): --  I&O's Summary      Physical Exam:  General: NAD  Cardiovascular: Normal S1 S2, No JVD, No murmurs, No edema  Respiratory: Lungs clear to auscultation	  Gastrointestinal:  Soft, +tenderness and rigid upper abdomen  Skin: warm and dry, No rashes, No ecchymoses, No cyanosis	  Extremities:  No clubbing, cyanosis or edema  Vascular: Peripheral pulses palpable 2+ bilaterally    LABS:	   	    CBC Full  -  ( 19 May 2018 17:00 )  WBC Count : 8.71 K/uL  Hemoglobin : 10.9 g/dL  Hematocrit : 34.0 %  Platelet Count - Automated : 217 K/uL  Mean Cell Volume : 93.9 fL  Mean Cell Hemoglobin : 30.1 pg  Mean Cell Hemoglobin Concentration : 32.1 %  Auto Neutrophil # : 5.32 K/uL  Auto Lymphocyte # : 2.01 K/uL  Auto Monocyte # : 0.84 K/uL  Auto Eosinophil # : 0.16 K/uL  Auto Basophil # : 0.06 K/uL  Auto Neutrophil % : 61.1 %  Auto Lymphocyte % : 23.1 %  Auto Monocyte % : 9.6 %  Auto Eosinophil % : 1.8 %  Auto Basophil % : 0.7 %    05-19    142  |  105  |  16  ----------------------------<  89  3.8   |  26  |  0.87  05-18    139  |  105  |  16  ----------------------------<  171<H>  4.4   |  23  |  0.78    Ca    8.4      19 May 2018 17:00  Ca    8.4      18 May 2018 05:00  Phos  2.1     05-18  Mg     2.1     05-18    TPro  6.2  /  Alb  3.5  /  TBili  0.3  /  DBili  x   /  AST  56<H>  /  ALT  77<H>  /  AlkPhos  67  05-19      	  ASSESSMENT/PLAN: 	    Marla Delgado, [place] [id number] Date of Admission:    CHIEF COMPLAINT:    HISTORY OF PRESENT ILLNESS: Patient is a 52F with PMH pulm HTN, PPM, s/p afib/flutter ablation POD #2 presents with chest pain and shortness of breath since last night. On physical exam there is +tenderness upper abdominal region and it is rigid.    	    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 diagnosed in 2016, was on Eliquis  Pulmonary HTN  Hemorrhoids, unspecified hemorrhoid type  Pacemaker: placed in 2003, replaced x 4 times, last  in 10/2016, [employer]  VSD (ventricular septal defect): treated surgically in 1971  Anemia, unspecified type  Lumbosacral disc disease  Herniated disc, cervical  Atrial fibrillation, unspecified type: diagnosed 14 years ago, on Eliquis, had ablation in 2003, DCCV in 2003  S/P VSD repair: in 1971  Cardiac pacemaker: placed in 2003, replaced x 4 times, last  in 10/2016, [employer]      FAMILY HISTORY:  Family history of heart attack      SOCIAL HISTORY:    [ ] Non-smoker  [ ] Smoker  [ ] Alcohol      REVIEW OF SYSTEMS:  See HPI. Otherwise, 10 point ROS done and otherwise negative.      T(C): 36.8 (05-19-18 @ 16:14), Max: 36.8 (05-19-18 @ 16:14)  HR: 67 (05-19-18 @ 18:35) (66 - 67)  BP: 123/67 (05-19-18 @ 18:35) (113/85 - 123/67)  RR: 18 (05-19-18 @ 18:35) (18 - 22)  SpO2: 99% (05-19-18 @ 18:35) (99% - 100%)  Wt(kg): --  I&O's Summary      Physical Exam:  General: NAD  Cardiovascular: Normal S1 S2, No JVD, No murmurs, No edema  Respiratory: Lungs clear to auscultation	  Gastrointestinal:  Soft, +tenderness and rigid upper abdomen  Skin: warm and dry, No rashes, No ecchymoses, No cyanosis	  Extremities:  No clubbing, cyanosis or edema  Vascular: Peripheral pulses palpable 2+ bilaterally    LABS:	   	    CBC Full  -  ( 19 May 2018 17:00 )  WBC Count : 8.71 K/uL  Hemoglobin : 10.9 g/dL  Hematocrit : 34.0 %  Platelet Count - Automated : 217 K/uL  Mean Cell Volume : 93.9 fL  Mean Cell Hemoglobin : 30.1 pg  Mean Cell Hemoglobin Concentration : 32.1 %  Auto Neutrophil # : 5.32 K/uL  Auto Lymphocyte # : 2.01 K/uL  Auto Monocyte # : 0.84 K/uL  Auto Eosinophil # : 0.16 K/uL  Auto Basophil # : 0.06 K/uL  Auto Neutrophil % : 61.1 %  Auto Lymphocyte % : 23.1 %  Auto Monocyte % : 9.6 %  Auto Eosinophil % : 1.8 %  Auto Basophil % : 0.7 %    05-19    142  |  105  |  16  ----------------------------<  89  3.8   |  26  |  0.87  05-18    139  |  105  |  16  ----------------------------<  171<H>  4.4   |  23  |  0.78    Ca    8.4      19 May 2018 17:00  Ca    8.4      18 May 2018 05:00  Phos  2.1     05-18  Mg     2.1     05-18    TPro  6.2  /  Alb  3.5  /  TBili  0.3  /  DBili  x   /  AST  56<H>  /  ALT  77<H>  /  AlkPhos  67  05-19      	  ASSESSMENT/PLAN: 	52F s/p ablation POD #2, now with epigastric pain. Plan: from a cardiac standpoint, Patient is hemodynamically stable. CBC normal and BMP normal. Awaiting CT angio of chest/abd/pelvis results to determine possible cause of epigastric pain. Would evaluate overnight and follow up with CT angio results. Plan discussed with Dr. Singh and report given to night NP,Tristian Church, she will follow up CT angio results[place] [id number]

## 2018-05-19 NOTE — ED ADULT NURSE NOTE - CHPI ED SYMPTOMS NEG
no diaphoresis/no nausea/no syncope/no back pain/no dizziness/no fever/no chills/no cough/no vomiting

## 2018-05-19 NOTE — ED PROVIDER NOTE - PHYSICAL EXAMINATION
Dr Montemayor  nontoxic appearing, talking in full sentences. pulse ox 98RA HR 62 SBP 99  no retractions, good air entry. soft nondistended, tender abdomen by epigastric. no guarding no rebound. nondistended abdomen. no pedal edema. no calf tenderness. normal pulses bilateral feet. Aox3

## 2018-05-19 NOTE — ED PROVIDER NOTE - OBJECTIVE STATEMENT
450PM Dr Montemayor  51yo F hx of pulmonary htn, ppm, sp ablation POD # 2 pw chest pain and sob since last night. States feels "like someone is sitting on my chest" constant since last night associated with SOB and MORSE. Feels better laying down. no fever. no cough. +pain by the epigastric region, "its all swollen and tense" no nausea/vomit/diarrhea. pt hx of atrial fib on eliquis

## 2018-05-19 NOTE — ED PROVIDER NOTE - PMH
Anemia, unspecified type    Atrial fibrillation, unspecified type  diagnosed 14 years ago, on Eliquis, had ablation in 2003, DCCV in 2003  Blood clot in vein  behind PPM ??? diagnosed in 2016, was on Eliquis  Hemorrhoids, unspecified hemorrhoid type    Herniated disc, cervical    Irritable bowel syndrome, unspecified type    Lumbosacral disc disease    Pacemaker  placed in 2003, replaced x 4 times, last  in 10/2016, [employer]  Pulmonary HTN    Raynaud's phenomenon without gangrene    VSD (ventricular septal defect)  treated surgically in 1971

## 2018-05-19 NOTE — ED PROVIDER NOTE - PROGRESS NOTE DETAILS
EP cardiology at bedside. EP cardiology at bedside. Concern for bleeding sp procedure will order ct angio chest/abdomen. DW radiology ct read. concerning for a perforated duodenum. plan blood gas, lipase, RUQ US, surgery consult, IVF, IV abx, and pain med. Pt and  informed of results and plan. surgery consulted. pt evaluated by surgery pending us to be done. called to expedite us DW radiology ct read. concerning for a perforated duodenum. A lot of edema of the duodenum/pancreas and gallbladder. plan blood gas, lipase, RUQ US, surgery consult, IVF, IV abx, and pain med. Pt and  informed of results and plan. surgery consulted. surgery recommends gastroenterology consult for ulcer not likely perforation as per surgery   Gastroenterology consulted. US shows GB wall thickening and edema, unclear etiology. GI will see pt in AM. d/w cards NP who recommended admission to med on tele. D/w hospitalist, will admit to telemetry

## 2018-05-19 NOTE — CONSULT NOTE ADULT - SUBJECTIVE AND OBJECTIVE BOX
CC: 52y old Female admitted with a chief complaint of chest and epigastric pain radiating to RUQ, now with concern for perforating ulcer on CT scan.    HPI: 52 year old female with complicated cardiac history secondary to VSD and right sided heart failure, associated with pulmonary HTN, and arrhythmia s/p multiple PPM and multiple cardiac ablations, who underwent repeat cardiac ablation on 5/17/19 presenting with pain that started substernally and radiated into the RUQ. Patient states that her procedure was substantially longer than anticipated, and she received additional IV fluids, and stayed overnight after the ablation. She was discharged to home with no pain and doing well. Approximately 8 hours after her discharge (24 hrs ago) she started having the chest pain, "under the breast bone." Initially she just rested, and assumed that it was associated with her surgery, but this morning it persisted and started to migrate under her ribs to the right. She has had pain like this one time before, when she had pre-eclampsia in pregnancy. Patient denies nausea or vomiting, fevers/chills, shortness of breath. She denies palpitations, dizziness, lightheadedness, weakness, visual changes, rash, change in bowel habits. She notes she has urinated less frequency than normal, and feels very "puffy and overloaded." Patient has a history of edema worse in the upper extremities and neck, intermittently managed with diuretics (but poorly tolerated due to cramping in her feet with loop diuretics).     PMHx: VSD, pHTN, afib/aflutter, DVT, Raynaud's IBS, herniated disc disease, anemia    PSHx: VSD repair, cardiac pacemaker (x4)    Medications (inpatient): morphine  - Injectable 4 milliGRAM(s) IV Push Once  Medications (home): metoprolol, biotin, FeSO4, zyrtec, gabapentin, eliquis, tranexamic acid (during menses, patient states she has menorrhagia associated with her Eliquis use, and takes this as needed to help prevent hemorrhaging and anemia)  Allergies: No Known Allergies  (Intolerances: None)  Social Hx: former smoker (quit 1987), seldom drinks EtOH (less than 3 drinks per month), denies illicit drug use; lives at home with , daughter (age 15) and mother  Family Hx: no known history of adverse reaction to anesthesia, unsure of bleeding disorder    Physical Exam  T(C): 36.8 (05-19-18 @ 16:14)  HR: 67 (05-19-18 @ 18:35) (66 - 67)  BP: 123/67 (05-19-18 @ 18:35) (113/85 - 123/67)  RR: 18 (05-19-18 @ 18:35) (18 - 22)  SpO2: 99% (05-19-18 @ 18:35) (99% - 100%)  Tmax: T(C): , Max: 36.8 (05-19-18 @ 16:14)    General: well developed, well nourished, NAD  Neuro: alert and oriented, no focal deficits, moves all extremities spontaneously  HEENT: NCAT, EOMI, anicteric, mucosa moist  Respiratory: airway patent, respirations unlabored  CVS: regular rate and rhythm  Abdomen: soft, nontender, nondistended  Extremities: no edema, sensation and movement grossly intact  Skin: warm, dry, appropriate color    Labs:                        10.9   8.71  )-----------( 217      ( 19 May 2018 17:00 )             34.0     PT/INR - ( 19 May 2018 17:00 )   PT: 12.3 SEC;   INR: 1.07          PTT - ( 19 May 2018 17:00 )  PTT:29.0 SEC  05-19    142  |  105  |  16  ----------------------------<  89  3.8   |  26  |  0.87    Ca    8.4      19 May 2018 17:00  Phos  2.1     05-18  Mg     2.1     05-18    TPro  6.2  /  Alb  3.5  /  TBili  0.3  /  DBili  x   /  AST  56<H>  /  ALT  77<H>  /  AlkPhos  67  05-19      Imaging and other studies:  < from: CT Angio Chest w/ IV Cont (05.19.18 @ 18:24) >  IMPRESSION:    No aortic dissection.    Proximal duodenal wall thickening surrounding inflammatory changes and ascites from duodenitis with possible perforated duodenal ulcer. Focal outpouching of air in the first portion the duodenum reflecting duodenal ulcer versus diverticulum.    Significant circumferential gallbladder wall thickening and edema may reflect associated cholecystitis. Recommend right upper quadrant ultrasound for further evaluation.    Small to moderate right greater than left pleural effusions, interlobular septal thickening and cardiomegaly indicating mild vascular congestion/interstitial edema.    Recommend surgical consultation.    < end of copied text > CC: 52y old Female admitted with a chief complaint of chest and epigastric pain radiating to RUQ, now with concern for perforating ulcer on CT scan.    HPI: 52 year old female with complicated cardiac history secondary to VSD and right sided heart failure, associated with pulmonary HTN, and arrhythmia s/p multiple PPM and multiple cardiac ablations, who underwent repeat cardiac ablation on 5/17/18 presenting with pain that started substernally and radiated into the RUQ. Patient states that her procedure was substantially longer than anticipated, and she received additional IV fluids, and stayed overnight after the ablation. She was discharged to home with no pain and doing well. Approximately 8 hours after her discharge (24 hrs ago) she started having the chest pain, "under the breast bone." Initially she just rested, and assumed that it was associated with her surgery, but this morning it persisted and started to migrate under her ribs to the right. She has had pain like this one time before, when she had pre-eclampsia in pregnancy. Patient denies nausea or vomiting, fevers/chills, shortness of breath. She denies palpitations, dizziness, lightheadedness, weakness, visual changes, rash, change in bowel habits. She notes she has urinated less frequency than normal, and feels very "puffy and overloaded." Patient has a history of edema worse in the upper extremities and neck, intermittently managed with diuretics (but poorly tolerated due to cramping in her feet with loop diuretics).     PMHx: VSD, pHTN, afib/aflutter, DVT, Raynaud's IBS, herniated disc disease, anemia    PSHx: VSD repair, cardiac pacemaker (x4)    Medications (inpatient): morphine  - Injectable 4 milliGRAM(s) IV Push Once  Medications (home): metoprolol, biotin, FeSO4, zyrtec, gabapentin, eliquis, tranexamic acid (during menses, patient states she has menorrhagia associated with her Eliquis use, and takes this as needed to help prevent hemorrhaging and anemia)  Allergies: No Known Allergies  (Intolerances: None)  Social Hx: former smoker (quit 1987), seldom drinks EtOH (less than 3 drinks per month), denies illicit drug use; lives at home with , daughter (age 15) and mother  Family Hx: no known history of adverse reaction to anesthesia, unsure of bleeding disorder    Physical Exam  T(C): 36.8 (05-19-18 @ 16:14)  HR: 67 (05-19-18 @ 18:35) (66 - 67)  BP: 123/67 (05-19-18 @ 18:35) (113/85 - 123/67)  RR: 18 (05-19-18 @ 18:35) (18 - 22)  SpO2: 99% (05-19-18 @ 18:35) (99% - 100%)  Tmax: T(C): , Max: 36.8 (05-19-18 @ 16:14)    General: well developed, well nourished, NAD  Neuro: alert and oriented, no focal deficits, moves all extremities spontaneously  HEENT: NCAT, EOMI, anicteric, mucosa moist  Respiratory: airway patent, respirations unlabored  CVS: regular rate and rhythm  Abdomen: soft, nontender, nondistended  Extremities: no edema, sensation and movement grossly intact  Skin: warm, dry, appropriate color    Labs:                        10.9   8.71  )-----------( 217      ( 19 May 2018 17:00 )             34.0     PT/INR - ( 19 May 2018 17:00 )   PT: 12.3 SEC;   INR: 1.07          PTT - ( 19 May 2018 17:00 )  PTT:29.0 SEC  05-19    142  |  105  |  16  ----------------------------<  89  3.8   |  26  |  0.87    Ca    8.4      19 May 2018 17:00  Phos  2.1     05-18  Mg     2.1     05-18    TPro  6.2  /  Alb  3.5  /  TBili  0.3  /  DBili  x   /  AST  56<H>  /  ALT  77<H>  /  AlkPhos  67  05-19      Imaging and other studies:  < from: CT Angio Chest w/ IV Cont (05.19.18 @ 18:24) >  IMPRESSION:    No aortic dissection.    Proximal duodenal wall thickening surrounding inflammatory changes and ascites from duodenitis with possible perforated duodenal ulcer. Focal outpouching of air in the first portion the duodenum reflecting duodenal ulcer versus diverticulum.    Significant circumferential gallbladder wall thickening and edema may reflect associated cholecystitis. Recommend right upper quadrant ultrasound for further evaluation.    Small to moderate right greater than left pleural effusions, interlobular septal thickening and cardiomegaly indicating mild vascular congestion/interstitial edema.    Recommend surgical consultation.    < end of copied text > CC: 52y old Female admitted with a chief complaint of chest and epigastric pain radiating to RUQ, now with concern for perforating ulcer on CT scan.    HPI: 52 year old female with complicated cardiac history secondary to VSD and right sided heart failure, associated with pulmonary HTN, and arrhythmia s/p multiple PPM and multiple cardiac ablations, who underwent repeat cardiac ablation on 5/17/18 presenting with pain that started substernally and radiated into the RUQ. Patient states that her procedure was substantially longer than anticipated, and she received additional IV fluids, and stayed overnight after the ablation. She was discharged to home with no pain and doing well. Approximately 8 hours after her discharge (24 hrs ago) she started having the chest pain, "under the breast bone." Initially she just rested, and assumed that it was associated with her surgery, but this morning it persisted and started to migrate under her ribs to the right. She has had pain like this one time before, when she had pre-eclampsia in pregnancy. Patient denies nausea or vomiting, fevers/chills, shortness of breath. She denies palpitations, dizziness, lightheadedness, weakness, visual changes, rash, change in bowel habits. She notes she has urinated less frequency than normal, and feels very "puffy and overloaded." Patient has a history of edema worse in the upper extremities and neck, intermittently managed with diuretics (but poorly tolerated due to cramping in her feet with loop diuretics).     PMHx: VSD, pHTN, afib/aflutter, DVT, Raynaud's IBS, herniated disc disease, anemia    PSHx: VSD repair, cardiac pacemaker (x4)    Medications (inpatient): morphine  - Injectable 4 milliGRAM(s) IV Push Once  Medications (home): metoprolol, biotin, FeSO4, zyrtec, gabapentin, eliquis, tranexamic acid (during menses, patient states she has menorrhagia associated with her Eliquis use, and takes this as needed to help prevent hemorrhaging and anemia)  Allergies: No Known Allergies  (Intolerances: None)  Social Hx: former smoker (quit 1987), seldom drinks EtOH (less than 3 drinks per month), denies illicit drug use; lives at home with , daughter (age 15) and mother  Family Hx: no known history of adverse reaction to anesthesia, unsure of bleeding disorder    Physical Exam  T(C): 36.8 (05-19-18 @ 16:14)  HR: 67 (05-19-18 @ 18:35) (66 - 67)  BP: 123/67 (05-19-18 @ 18:35) (113/85 - 123/67)  RR: 18 (05-19-18 @ 18:35) (18 - 22)  SpO2: 99% (05-19-18 @ 18:35) (99% - 100%)  Tmax: T(C): , Max: 36.8 (05-19-18 @ 16:14)    General: well developed, well nourished, NAD  Neuro: alert and oriented, no focal deficits, moves all extremities spontaneously  HEENT: NCAT, EOMI, anicteric, mucosa moist  Respiratory: airway patent, respirations unlabored  CVS: regular rate and rhythm, sinus on monitor, with PVCs  Abdomen: soft, mildly distended, with voluntary guarding in RUQ and epigastric area, no rebound tenderness, no peritoneal signs  Extremities: mild pitting edema in upper and lower extremities, sensation and movement grossly intact  Skin: warm, dry, appropriate color    Labs:                        10.9   8.71  )-----------( 217      ( 19 May 2018 17:00 )             34.0     PT/INR - ( 19 May 2018 17:00 )   PT: 12.3 SEC;   INR: 1.07          PTT - ( 19 May 2018 17:00 )  PTT:29.0 SEC  05-19    142  |  105  |  16  ----------------------------<  89  3.8   |  26  |  0.87    Ca    8.4      19 May 2018 17:00  Phos  2.1     05-18  Mg     2.1     05-18    TPro  6.2  /  Alb  3.5  /  TBili  0.3  /  DBili  x   /  AST  56<H>  /  ALT  77<H>  /  AlkPhos  67  05-19      Imaging and other studies:  < from: CT Angio Chest w/ IV Cont (05.19.18 @ 18:24) >  IMPRESSION:    No aortic dissection.    Proximal duodenal wall thickening surrounding inflammatory changes and ascites from duodenitis with possible perforated duodenal ulcer. Focal outpouching of air in the first portion the duodenum reflecting duodenal ulcer versus diverticulum.    Significant circumferential gallbladder wall thickening and edema may reflect associated cholecystitis. Recommend right upper quadrant ultrasound for further evaluation.    Small to moderate right greater than left pleural effusions, interlobular septal thickening and cardiomegaly indicating mild vascular congestion/interstitial edema.    Recommend surgical consultation.    < end of copied text >

## 2018-05-19 NOTE — ED PROVIDER NOTE - MEDICAL DECISION MAKING DETAILS
plan ekg, labs, cxr, tele monitoring, ct angio rule out pe, pt doesn't want pain med, gentle IVF, cardiology EP to see pt

## 2018-05-19 NOTE — ED ADULT TRIAGE NOTE - CHIEF COMPLAINT QUOTE
Pt c/o chest tightness and SOB. states there is "a heavy feeling on my chest." Pt had cardiac ablation done 2 days ago. Swelling observed to B/L upper extremities. Hx: Afib on eliquis, pulmonary HTN, Pacemaker, blood clot. Appears uncomfortable.

## 2018-05-19 NOTE — CONSULT NOTE ADULT - ASSESSMENT
52 year old female presenting with epigastric and RUQ pain following cardiac ablation; afebrile and hemodynamically stable.    - reviewed CT scan with Dr. Moore (attending radiologist) regarding areas of concern in duodenum; agree that ascites, gallbladder wall thickening and duodenal wall edema likely associated with congestive hepatopathy; however, area of wall thinning in second portion of duodenum concerning for evolving ulcer with concern for perforation versus duodenal diverticulum. At this time, there is no free air or evidence of acute perforation.  - patient with no known history of peptic ulcer disease, GERD, heartburn or other upper GI symptoms, she does not take a PPI or H2 blocker at home, and has no current symptoms associated with peptic ulcer disease.  - would consider GI consultation for endoscopic evaluation.  - patient may benefit from ulcer prophylaxis with IV protonix and/or PO sulcralfate.  - extensively discussed with patient and her  the CT scan findings and differential diagnoses, with plan for serial abdominal exams, no role for urgent surgical intervention at this time.  - will continue to follow with B team surgery.    Discussed with Dr. Palacios, seen and examined with Dr. Márquez (PGY-2)  --WHITLEY Chaney, [id number] 52 year old female presenting with epigastric and RUQ pain following cardiac ablation; afebrile and hemodynamically stable.    - reviewed CT scan with Dr. Moore (attending radiologist) regarding areas of concern in duodenum; agree that ascites, gallbladder wall thickening and duodenal wall edema likely associated with congestive hepatopathy; however, area of wall thinning in second portion of duodenum concerning for evolving ulcer with concern for perforation versus duodenal diverticulum. At this time, there is no free air or evidence of acute perforation.  - patient with no known history of peptic ulcer disease, GERD, heartburn or other upper GI symptoms, she does not take a PPI or H2 blocker at home, and has no current symptoms associated with peptic ulcer disease.  - would consider GI consultation for endoscopic evaluation; appreciate cardiology regarding optimization of R heart dysfunction and fluid management.  - patient may benefit from ulcer prophylaxis with IV protonix and/or PO sulcralfate.  - extensively discussed with patient and her  the CT scan findings and differential diagnoses, with plan for serial abdominal exams, no role for urgent surgical intervention at this time.  - will continue to follow with B team surgery.    Discussed with Dr. Palacios, seen and examined with Dr. Márquez (PGY-2)  --WHITLEY Chaney, [id number]

## 2018-05-20 DIAGNOSIS — R10.13 EPIGASTRIC PAIN: ICD-10-CM

## 2018-05-20 DIAGNOSIS — K81.9 CHOLECYSTITIS, UNSPECIFIED: ICD-10-CM

## 2018-05-20 DIAGNOSIS — Z29.9 ENCOUNTER FOR PROPHYLACTIC MEASURES, UNSPECIFIED: ICD-10-CM

## 2018-05-20 DIAGNOSIS — I48.91 UNSPECIFIED ATRIAL FIBRILLATION: ICD-10-CM

## 2018-05-20 DIAGNOSIS — I27.20 PULMONARY HYPERTENSION, UNSPECIFIED: ICD-10-CM

## 2018-05-20 DIAGNOSIS — K26.9 DUODENAL ULCER, UNSPECIFIED AS ACUTE OR CHRONIC, WITHOUT HEMORRHAGE OR PERFORATION: ICD-10-CM

## 2018-05-20 DIAGNOSIS — M51.9 UNSPECIFIED THORACIC, THORACOLUMBAR AND LUMBOSACRAL INTERVERTEBRAL DISC DISORDER: ICD-10-CM

## 2018-05-20 PROBLEM — I73.00 RAYNAUD'S SYNDROME WITHOUT GANGRENE: Chronic | Status: ACTIVE | Noted: 2018-05-17

## 2018-05-20 PROBLEM — D64.9 ANEMIA, UNSPECIFIED: Chronic | Status: INACTIVE | Noted: 2018-05-17 | Resolved: 2018-05-20

## 2018-05-20 LAB
ALBUMIN SERPL ELPH-MCNC: 3.5 G/DL — SIGNIFICANT CHANGE UP (ref 3.3–5)
ALP SERPL-CCNC: 61 U/L — SIGNIFICANT CHANGE UP (ref 40–120)
ALT FLD-CCNC: 66 U/L — HIGH (ref 4–33)
APTT BLD: 141.5 SEC — CRITICAL HIGH (ref 27.5–37.4)
AST SERPL-CCNC: 39 U/L — HIGH (ref 4–32)
BASOPHILS # BLD AUTO: 0.05 K/UL — SIGNIFICANT CHANGE UP (ref 0–0.2)
BASOPHILS NFR BLD AUTO: 0.6 % — SIGNIFICANT CHANGE UP (ref 0–2)
BILIRUB SERPL-MCNC: 0.4 MG/DL — SIGNIFICANT CHANGE UP (ref 0.2–1.2)
BUN SERPL-MCNC: 15 MG/DL — SIGNIFICANT CHANGE UP (ref 7–23)
CALCIUM SERPL-MCNC: 8.4 MG/DL — SIGNIFICANT CHANGE UP (ref 8.4–10.5)
CHLORIDE SERPL-SCNC: 103 MMOL/L — SIGNIFICANT CHANGE UP (ref 98–107)
CK MB BLD-MCNC: 1.89 NG/ML — SIGNIFICANT CHANGE UP (ref 1–4.7)
CK MB BLD-MCNC: 2.2 NG/ML — SIGNIFICANT CHANGE UP (ref 1–4.7)
CK SERPL-CCNC: 63 U/L — SIGNIFICANT CHANGE UP (ref 25–170)
CK SERPL-CCNC: 74 U/L — SIGNIFICANT CHANGE UP (ref 25–170)
CO2 SERPL-SCNC: 25 MMOL/L — SIGNIFICANT CHANGE UP (ref 22–31)
CREAT SERPL-MCNC: 0.85 MG/DL — SIGNIFICANT CHANGE UP (ref 0.5–1.3)
EOSINOPHIL # BLD AUTO: 0.12 K/UL — SIGNIFICANT CHANGE UP (ref 0–0.5)
EOSINOPHIL NFR BLD AUTO: 1.3 % — SIGNIFICANT CHANGE UP (ref 0–6)
GLUCOSE SERPL-MCNC: 138 MG/DL — HIGH (ref 70–99)
HCT VFR BLD CALC: 32.6 % — LOW (ref 34.5–45)
HCT VFR BLD CALC: 33.5 % — LOW (ref 34.5–45)
HGB BLD-MCNC: 10.4 G/DL — LOW (ref 11.5–15.5)
HGB BLD-MCNC: 10.9 G/DL — LOW (ref 11.5–15.5)
IMM GRANULOCYTES # BLD AUTO: 0.25 # — SIGNIFICANT CHANGE UP
IMM GRANULOCYTES NFR BLD AUTO: 2.8 % — HIGH (ref 0–1.5)
LYMPHOCYTES # BLD AUTO: 1.58 K/UL — SIGNIFICANT CHANGE UP (ref 1–3.3)
LYMPHOCYTES # BLD AUTO: 17.8 % — SIGNIFICANT CHANGE UP (ref 13–44)
MAGNESIUM SERPL-MCNC: 2.2 MG/DL — SIGNIFICANT CHANGE UP (ref 1.6–2.6)
MCHC RBC-ENTMCNC: 30 PG — SIGNIFICANT CHANGE UP (ref 27–34)
MCHC RBC-ENTMCNC: 30.2 PG — SIGNIFICANT CHANGE UP (ref 27–34)
MCHC RBC-ENTMCNC: 31.9 % — LOW (ref 32–36)
MCHC RBC-ENTMCNC: 32.5 % — SIGNIFICANT CHANGE UP (ref 32–36)
MCV RBC AUTO: 92.8 FL — SIGNIFICANT CHANGE UP (ref 80–100)
MCV RBC AUTO: 93.9 FL — SIGNIFICANT CHANGE UP (ref 80–100)
MONOCYTES # BLD AUTO: 0.73 K/UL — SIGNIFICANT CHANGE UP (ref 0–0.9)
MONOCYTES NFR BLD AUTO: 8.2 % — SIGNIFICANT CHANGE UP (ref 2–14)
NEUTROPHILS # BLD AUTO: 6.16 K/UL — SIGNIFICANT CHANGE UP (ref 1.8–7.4)
NEUTROPHILS NFR BLD AUTO: 69.3 % — SIGNIFICANT CHANGE UP (ref 43–77)
NRBC # FLD: 0 — SIGNIFICANT CHANGE UP
NRBC # FLD: 0 — SIGNIFICANT CHANGE UP
PHOSPHATE SERPL-MCNC: 3.9 MG/DL — SIGNIFICANT CHANGE UP (ref 2.5–4.5)
PLATELET # BLD AUTO: 207 K/UL — SIGNIFICANT CHANGE UP (ref 150–400)
PLATELET # BLD AUTO: 214 K/UL — SIGNIFICANT CHANGE UP (ref 150–400)
PMV BLD: 9.7 FL — SIGNIFICANT CHANGE UP (ref 7–13)
PMV BLD: 9.7 FL — SIGNIFICANT CHANGE UP (ref 7–13)
POTASSIUM SERPL-MCNC: 4.3 MMOL/L — SIGNIFICANT CHANGE UP (ref 3.5–5.3)
POTASSIUM SERPL-SCNC: 4.3 MMOL/L — SIGNIFICANT CHANGE UP (ref 3.5–5.3)
PROT SERPL-MCNC: 5.9 G/DL — LOW (ref 6–8.3)
RBC # BLD: 3.47 M/UL — LOW (ref 3.8–5.2)
RBC # BLD: 3.61 M/UL — LOW (ref 3.8–5.2)
RBC # FLD: 13.3 % — SIGNIFICANT CHANGE UP (ref 10.3–14.5)
RBC # FLD: 13.6 % — SIGNIFICANT CHANGE UP (ref 10.3–14.5)
SODIUM SERPL-SCNC: 141 MMOL/L — SIGNIFICANT CHANGE UP (ref 135–145)
TROPONIN T SERPL-MCNC: 0.28 NG/ML — HIGH (ref 0–0.06)
TROPONIN T SERPL-MCNC: 0.36 NG/ML — HIGH (ref 0–0.06)
WBC # BLD: 8.89 K/UL — SIGNIFICANT CHANGE UP (ref 3.8–10.5)
WBC # BLD: 9.15 K/UL — SIGNIFICANT CHANGE UP (ref 3.8–10.5)
WBC # FLD AUTO: 8.89 K/UL — SIGNIFICANT CHANGE UP (ref 3.8–10.5)
WBC # FLD AUTO: 9.15 K/UL — SIGNIFICANT CHANGE UP (ref 3.8–10.5)

## 2018-05-20 PROCEDURE — 99233 SBSQ HOSP IP/OBS HIGH 50: CPT

## 2018-05-20 PROCEDURE — 99222 1ST HOSP IP/OBS MODERATE 55: CPT | Mod: GC

## 2018-05-20 PROCEDURE — 12345: CPT | Mod: NC

## 2018-05-20 PROCEDURE — 99223 1ST HOSP IP/OBS HIGH 75: CPT | Mod: GC

## 2018-05-20 RX ORDER — GABAPENTIN 400 MG/1
1 CAPSULE ORAL
Qty: 0 | Refills: 0 | COMMUNITY

## 2018-05-20 RX ORDER — MORPHINE SULFATE 50 MG/1
2 CAPSULE, EXTENDED RELEASE ORAL EVERY 4 HOURS
Qty: 0 | Refills: 0 | Status: DISCONTINUED | OUTPATIENT
Start: 2018-05-20 | End: 2018-05-21

## 2018-05-20 RX ORDER — METOPROLOL TARTRATE 50 MG
100 TABLET ORAL DAILY
Qty: 0 | Refills: 0 | Status: DISCONTINUED | OUTPATIENT
Start: 2018-05-20 | End: 2018-05-21

## 2018-05-20 RX ORDER — PIPERACILLIN AND TAZOBACTAM 4; .5 G/20ML; G/20ML
3.38 INJECTION, POWDER, LYOPHILIZED, FOR SOLUTION INTRAVENOUS EVERY 8 HOURS
Qty: 0 | Refills: 0 | Status: DISCONTINUED | OUTPATIENT
Start: 2018-05-20 | End: 2018-05-20

## 2018-05-20 RX ORDER — LORATADINE 10 MG/1
10 TABLET ORAL DAILY
Qty: 0 | Refills: 0 | Status: DISCONTINUED | OUTPATIENT
Start: 2018-05-20 | End: 2018-05-21

## 2018-05-20 RX ORDER — ACETAMINOPHEN 500 MG
650 TABLET ORAL EVERY 6 HOURS
Qty: 0 | Refills: 0 | Status: DISCONTINUED | OUTPATIENT
Start: 2018-05-20 | End: 2018-05-21

## 2018-05-20 RX ORDER — TRANEXAMIC ACID 100 MG/ML
2 INJECTION, SOLUTION INTRAVENOUS
Qty: 0 | Refills: 0 | COMMUNITY

## 2018-05-20 RX ORDER — METOPROLOL TARTRATE 50 MG
100 TABLET ORAL DAILY
Qty: 0 | Refills: 0 | Status: DISCONTINUED | OUTPATIENT
Start: 2018-05-20 | End: 2018-05-20

## 2018-05-20 RX ORDER — PANTOPRAZOLE SODIUM 20 MG/1
8 TABLET, DELAYED RELEASE ORAL
Qty: 80 | Refills: 0 | Status: DISCONTINUED | OUTPATIENT
Start: 2018-05-20 | End: 2018-05-20

## 2018-05-20 RX ORDER — PANTOPRAZOLE SODIUM 20 MG/1
40 TABLET, DELAYED RELEASE ORAL
Qty: 0 | Refills: 0 | Status: DISCONTINUED | OUTPATIENT
Start: 2018-05-20 | End: 2018-05-20

## 2018-05-20 RX ORDER — FERROUS SULFATE 325(65) MG
1 TABLET ORAL
Qty: 0 | Refills: 0 | COMMUNITY

## 2018-05-20 RX ORDER — GABAPENTIN 400 MG/1
300 CAPSULE ORAL AT BEDTIME
Qty: 0 | Refills: 0 | Status: DISCONTINUED | OUTPATIENT
Start: 2018-05-20 | End: 2018-05-21

## 2018-05-20 RX ORDER — PANTOPRAZOLE SODIUM 20 MG/1
40 TABLET, DELAYED RELEASE ORAL ONCE
Qty: 0 | Refills: 0 | Status: COMPLETED | OUTPATIENT
Start: 2018-05-19 | End: 2018-05-19

## 2018-05-20 RX ORDER — FERROUS SULFATE 325(65) MG
325 TABLET ORAL DAILY
Qty: 0 | Refills: 0 | Status: DISCONTINUED | OUTPATIENT
Start: 2018-05-20 | End: 2018-05-21

## 2018-05-20 RX ORDER — HEPARIN SODIUM 5000 [USP'U]/ML
INJECTION INTRAVENOUS; SUBCUTANEOUS
Qty: 25000 | Refills: 0 | Status: DISCONTINUED | OUTPATIENT
Start: 2018-05-20 | End: 2018-05-20

## 2018-05-20 RX ORDER — HEPARIN SODIUM 5000 [USP'U]/ML
3500 INJECTION INTRAVENOUS; SUBCUTANEOUS EVERY 6 HOURS
Qty: 0 | Refills: 0 | Status: DISCONTINUED | OUTPATIENT
Start: 2018-05-20 | End: 2018-05-20

## 2018-05-20 RX ORDER — HEPARIN SODIUM 5000 [USP'U]/ML
7000 INJECTION INTRAVENOUS; SUBCUTANEOUS EVERY 6 HOURS
Qty: 0 | Refills: 0 | Status: DISCONTINUED | OUTPATIENT
Start: 2018-05-20 | End: 2018-05-20

## 2018-05-20 RX ORDER — PANTOPRAZOLE SODIUM 20 MG/1
40 TABLET, DELAYED RELEASE ORAL
Qty: 0 | Refills: 0 | Status: DISCONTINUED | OUTPATIENT
Start: 2018-05-21 | End: 2018-05-21

## 2018-05-20 RX ORDER — FUROSEMIDE 40 MG
20 TABLET ORAL ONCE
Qty: 0 | Refills: 0 | Status: COMPLETED | OUTPATIENT
Start: 2018-05-20 | End: 2018-05-20

## 2018-05-20 RX ORDER — APIXABAN 2.5 MG/1
5 TABLET, FILM COATED ORAL EVERY 12 HOURS
Qty: 0 | Refills: 0 | Status: DISCONTINUED | OUTPATIENT
Start: 2018-05-20 | End: 2018-05-21

## 2018-05-20 RX ORDER — PANTOPRAZOLE SODIUM 20 MG/1
40 TABLET, DELAYED RELEASE ORAL ONCE
Qty: 0 | Refills: 0 | Status: DISCONTINUED | OUTPATIENT
Start: 2018-05-20 | End: 2018-05-20

## 2018-05-20 RX ORDER — APIXABAN 2.5 MG/1
1 TABLET, FILM COATED ORAL
Qty: 0 | Refills: 0 | COMMUNITY

## 2018-05-20 RX ORDER — METOPROLOL TARTRATE 50 MG
1 TABLET ORAL
Qty: 0 | Refills: 0 | COMMUNITY

## 2018-05-20 RX ORDER — CETIRIZINE HYDROCHLORIDE 10 MG/1
1 TABLET ORAL
Qty: 0 | Refills: 0 | COMMUNITY

## 2018-05-20 RX ADMIN — Medication 20 MILLIGRAM(S): at 05:53

## 2018-05-20 RX ADMIN — HEPARIN SODIUM 1300 UNIT(S)/HR: 5000 INJECTION INTRAVENOUS; SUBCUTANEOUS at 17:50

## 2018-05-20 RX ADMIN — HEPARIN SODIUM 1600 UNIT(S)/HR: 5000 INJECTION INTRAVENOUS; SUBCUTANEOUS at 07:46

## 2018-05-20 RX ADMIN — Medication 100 MILLIGRAM(S): at 06:45

## 2018-05-20 RX ADMIN — PANTOPRAZOLE SODIUM 40 MILLIGRAM(S): 20 TABLET, DELAYED RELEASE ORAL at 00:16

## 2018-05-20 RX ADMIN — GABAPENTIN 300 MILLIGRAM(S): 400 CAPSULE ORAL at 01:44

## 2018-05-20 RX ADMIN — GABAPENTIN 300 MILLIGRAM(S): 400 CAPSULE ORAL at 22:20

## 2018-05-20 RX ADMIN — HEPARIN SODIUM 0 UNIT(S)/HR: 5000 INJECTION INTRAVENOUS; SUBCUTANEOUS at 16:50

## 2018-05-20 RX ADMIN — PANTOPRAZOLE SODIUM 10 MG/HR: 20 TABLET, DELAYED RELEASE ORAL at 03:33

## 2018-05-20 RX ADMIN — APIXABAN 5 MILLIGRAM(S): 2.5 TABLET, FILM COATED ORAL at 22:20

## 2018-05-20 RX ADMIN — Medication 325 MILLIGRAM(S): at 18:05

## 2018-05-20 NOTE — H&P ADULT - NSHPLABSRESULTS_GEN_ALL_CORE
Labs personally reviewed and interpreted. Notable for no leukocytosis, Hb stable at 11.9, lipase 65, creatinine 0.87 with BUN 16. Troponin 0.39. Lactate 1.0. AST and ALT mildly elevated at 56 and 77 respectively with bili normal.    CXR personally reviewed and interpreted. Notable for  CT personally reviewed and interpreted. Notable for    EKG personally reviewed. Rate 62, , QTc 468. Normal sinus rhythm with normal axis, 1st degree heart block with RSR' pattern and wide QRS in lead III and aVF, all unchanged from previous EKG. Labs personally reviewed and interpreted. Notable for no leukocytosis, Hb stable at 11.9, lipase 65, creatinine 0.87 with BUN 16. Troponin 0.39. Lactate 1.0. AST and ALT mildly elevated at 56 and 77 respectively with bili normal.    CXR personally reviewed and interpreted. Notable for small R>L bilateral pleural effusions. No obvious cardiomegaly, or interstitial markings.  CT A/P personally reviewed and interpreted. Notable for:  LUNGS AND LARGE AIRWAYS: Patent central airways. No pulmonary nodules. Mild compressive and subsegmental linear atelectasis.  PLEURA: Small bilateral pleural effusions, right greater than left.  VESSELS: Within normal limits.  HEART: Heart size is enlarged. No pericardial effusion.  MEDIASTINUM AND KEZIA: No lymphadenopathy.  CHEST WALL AND LOWER NECK: Status post median sternotomy. Left-sided pacemaker with lead tips in the right atrium and right ventricle.  LIVER: Within normal limits.  BILE DUCTS: Normal caliber.  GALLBLADDER: Marked circumferential gallbladder wall thickening and pericholecystic fluid.  SPLEEN: Within normal limits.  PANCREAS: Within normal limits.  ADRENALS: Within normal limits.  KIDNEYS/URETERS: Within normal limits.  BLADDER: Within normal limits.  REPRODUCTIVE ORGANS: The uterus and adnexa are within normal limits.  BOWEL: There is mild wall thickening of the first and second portions of the duodenum with surrounding inflammatory changes and fluid. Focal outpouching of air in the first portion of the duodenum (3-143) reflecting duodenal ulcer versus diverticulum. Additional smaller diverticulum is noted in the horizontal portion of the diverticulum, just left of midline (61-46). No bowel obstruction. Colonic diverticulosis. Appendix is normal.  PERITONEUM: Moderate pelvic ascites.  VESSELS:  No aortic aneurysm or dissection. Right hepatic artery originates from the SMA.  RETROPERITONEUM: Multiple subcentimeter mesenteric and retroperitoneal lymph nodes.    ABDOMINAL WALL: Tiny fat-containing umbilical hernia.  BONES: Mild degenerative changes of the spine.    IMPRESSION:  No aortic dissection.  Proximal duodenal wall thickening surrounding inflammatory changes and ascites from duodenitis with possible perforated duodenal ulcer. Focal outpouching of air in the first portion the duodenum reflecting duodenal ulcer versus diverticulum.  Significant circumferential gallbladder wall thickening and edema may reflect associated cholecystitis. Recommend right upper quadrant ultrasound for further evaluation.  Small to moderate right greater than left pleural effusions, interlobular septal thickening and cardiomegaly indicating mild vascular congestion/interstitial edema    EKG personally reviewed. Rate 62, , QTc 468. Normal sinus rhythm with normal axis, 1st degree heart block with RSR' pattern and wide QRS in lead III and aVF, all unchanged from previous EKG.

## 2018-05-20 NOTE — PROGRESS NOTE ADULT - ASSESSMENT
52F with a PMH VSD s/p repair in childhood, atrial fibrillation s/p ablation 2003 and 5/17/18 and resultant PPM placement ([facility], 2003), Raynaud's, and disc herniation who presents with abdominal pain likely due to duodenal ulcer. 52F with a PMH VSD s/p repair in childhood, atrial fibrillation s/p ablation 2003 and 5/17/18 and resultant PPM placement ([facility], 2003), Raynaud's, and disc herniation who presents with abdominal pain due to acalculous cholecystitis vs duodenal ulcer

## 2018-05-20 NOTE — PROGRESS NOTE ADULT - SUBJECTIVE AND OBJECTIVE BOX
Patient is a 52y old  Female who presents with a chief complaint of abdominal pain (20 May 2018 03:30)      SUBJECTIVE / OVERNIGHT EVENTS: Pain mainly on RUQ now.  Patient wants to eat    MEDICATIONS  (STANDING):  ferrous    sulfate 325 milliGRAM(s) Oral daily  gabapentin 300 milliGRAM(s) Oral at bedtime  heparin  Infusion.  Unit(s)/Hr (16 mL/Hr) IV Continuous <Continuous>  loratadine 10 milliGRAM(s) Oral daily  metoprolol succinate  milliGRAM(s) Oral daily  pantoprazole  Injectable 40 milliGRAM(s) IV Push two times a day    MEDICATIONS  (PRN):  acetaminophen   Tablet. 650 milliGRAM(s) Oral every 6 hours PRN Mild and Moderate Pain  heparin  Injectable 7000 Unit(s) IV Push every 6 hours PRN For aPTT less than 40  heparin  Injectable 3500 Unit(s) IV Push every 6 hours PRN For aPTT between 40 - 57  morphine  - Injectable 2 milliGRAM(s) IV Push every 4 hours PRN Severe Pain (7 - 10)      Vital Signs Last 24 Hrs  T(C): 36.8 (20 May 2018 05:40), Max: 36.8 (19 May 2018 16:14)  T(F): 98.3 (20 May 2018 05:40), Max: 98.3 (20 May 2018 05:40)  HR: 60 (20 May 2018 05:40) (60 - 70)  BP: 107/68 (20 May 2018 06:43) (97/62 - 133/72)  BP(mean): --  RR: 18 (20 May 2018 05:40) (18 - 22)  SpO2: 94% (20 May 2018 05:40) (92% - 100%)  CAPILLARY BLOOD GLUCOSE        I&O's Summary      PHYSICAL EXAM:  GENERAL: NAD, well-developed  HEAD:  Atraumatic, Normocephalic  EYES: EOMI, PERRLA, conjunctiva and sclera clear  NECK: Supple, No JVD  CHEST/LUNG: Clear to auscultation bilaterally; No wheeze  HEART: Regular rate and rhythm; No murmurs, rubs, or gallops  ABDOMEN: Mild RUQ tenderness, no rebound  EXTREMITIES:  2+ Peripheral Pulses, No clubbing, cyanosis, or edema  PSYCH: AAOx3  NEUROLOGY: non-focal  SKIN: No rashes or lesions    LABS:                        10.4   8.89  )-----------( 207      ( 20 May 2018 05:59 )             32.6     05-20    141  |  103  |  15  ----------------------------<  138<H>  4.3   |  25  |  0.85    Ca    8.4      20 May 2018 05:59  Phos  3.9     05-20  Mg     2.2     05-20    TPro  5.9<L>  /  Alb  3.5  /  TBili  0.4  /  DBili  x   /  AST  39<H>  /  ALT  66<H>  /  AlkPhos  61  05-20    PT/INR - ( 19 May 2018 17:00 )   PT: 12.3 SEC;   INR: 1.07          PTT - ( 19 May 2018 17:00 )  PTT:29.0 SEC  CARDIAC MARKERS ( 20 May 2018 05:59 )  x     / 0.28 ng/mL / 63 u/L / 1.89 ng/mL / x      CARDIAC MARKERS ( 20 May 2018 01:38 )  x     / 0.36 ng/mL / 74 u/L / 2.20 ng/mL / x      CARDIAC MARKERS ( 19 May 2018 17:00 )  x     / 0.39 ng/mL / x     / x     / x                Consultant(s) Notes Reviewed:  surgery, EP

## 2018-05-20 NOTE — CHART NOTE - NSCHARTNOTEFT_GEN_A_CORE
Pt had BP 97/62 without tachycardia. Pt noted her baseline BP is 90s/60s. Pt resting comfortably without complaints. Will give Metoprolol succinate. Pt had BP 97/62 without tachycardia. Pt noted her baseline BP is 90s/60s. Pt resting comfortably without complaints. Will give Metoprolol succinate.    Aryles Hedjar, PGY-2

## 2018-05-20 NOTE — H&P ADULT - PROBLEM SELECTOR PLAN 6
- DVT PPX: on Eliquis, will hold for now in lieu of possible procedure  - Diet: NPO for now  - Dispo: pending GI workup/procedure - DVT PPX: on Eliquis, will hold for now in lieu of possible procedure, will place on heparin gtt  - Diet: NPO for now  - Dispo: pending GI workup/procedure

## 2018-05-20 NOTE — DOWNTIME INTERRUPTION NOTE - WHICH MANUAL FORMS INITIATED?
Downtime orders sheet initiated by provider at 0220. Medication administration record form initiated. Protonix gtt started at 0305.

## 2018-05-20 NOTE — H&P ADULT - HISTORY OF PRESENT ILLNESS
53 yo F with a PMH VSD s/p repair in childhood, atrial fibrillation s/p ablation 2003 and 5/17/18 and resultant PPM placement ([facility], 2003), Raynaud's, and disc herniation who presents with abdominal pain. She states the pain is epigastric and "moving under the ribcages," achy, and 8/10 in severity, at its worst. She denies CP or SOB but notes the pain is pleuritic. She denies nausea, vomiting, diarrhea, fevers, chills, melena, or hematochezia. Her last BM was yesterday. She was hospitalized from 5/17-18/18 for an extended ablation for her afib (lasting about 6-7 hours) and she notes significant diffuse edema since then. She denies orthopnea or PND. She has chronic palpitations and noted those continued since the surgery, lasting about 1 minute at a time.    In the ED, she was given Zosyn x1, Morphine 4mg IV x1, and Pantoprazole 40mg IV x1. Her pain is currently a 4/10 after those meds.  ED VS: Temp 98.2F, HR 66, 115/65, RR 22-->18, O2 100% on RA.

## 2018-05-20 NOTE — H&P ADULT - ATTENDING COMMENTS
Patient seen and examined on 5/20/18, case discussed with Marie, agree with assessment and plan as above. Patient seen and examined on 5/20/18, case discussed with Marie, agree with assessment and plan as above. This is a 52F with history as above who initially presented for a AFib ablation on 5/17. After the procedure she was observed in the CCU and did well. She was then discharged on 5/18 and said that after returning home she started to have significant abdominal pain and therefore returned to the hospital for evaluation. Here, she is complaining of significant epigastric pain and had a CT A/P that showed a possible duodenal ulcer vs diverticulum with questionable perforation. She was evaluated by surgery and after examining her and reviewing the CT with radiology they said that the patient likely has an evolving duodenal ulcer without findings of free air or evidence of acute perforation. They recommended medicine management and GI eval for her ulcer and she was admitted to medicine.   - Currently, patient still has significant abdominal pain on palpation, suspect that she has the patient 2/2 her recent ablation, will therefore c/w PPI drip, will keep patient NPO and will consult GI in AM  - Patient was evaluated by cardiology Patient seen and examined on 5/20/18, case discussed with Marie, agree with assessment and plan as above. This is a 52F with history as above who initially presented for a AFib ablation on 5/17. After the procedure she was observed in the CCU and did well. She was then discharged on 5/18 and said that after returning home she started to have significant abdominal pain and therefore returned to the hospital for evaluation. Here, she is complaining of significant epigastric pain and had a CT A/P that showed a possible duodenal ulcer vs diverticulum with questionable perforation. She was evaluated by surgery and after examining her and reviewing the CT with radiology they said that the patient likely has an evolving duodenal ulcer without findings of free air or evidence of acute perforation. They recommended medicine management and GI eval for her ulcer and she was admitted to medicine.   - Currently, patient still has significant abdominal pain on palpation, suspect that she has the patient 2/2 her recent ablation, will therefore c/w protonix (BID dosing for now), will keep patient NPO and will consult GI in AM  - Patient was evaluated by cardiology, will place patient on heparin gtt, will c/w home metoprolol and f/u further recs  - Patient's elevated troponin likely in setting of her recent ablation, downtrending currently, will f/u further cardiology recs  - Other care as per the assessment and plan above Patient seen and examined on 5/20/18, case discussed with Marie, agree with assessment and plan as above. This is a 52F with history as above who initially presented for a AFib ablation on 5/17. After the procedure she was observed in the CCU and did well. She was then discharged on 5/18 and said that after returning home she started to have significant abdominal pain and therefore returned to the hospital for evaluation. Here, she is complaining of significant epigastric pain and had a CT A/P that showed a possible duodenal ulcer vs diverticulum with questionable perforation. She was evaluated by surgery and after examining her and reviewing the CT with radiology they said that the patient likely has an evolving duodenal ulcer without findings of free air or evidence of acute perforation. They recommended medicine management and GI eval for her ulcer and she was admitted to medicine.   - Currently, patient still has significant abdominal pain on palpation, suspect that her pain is from her evolving duodenal ulcer, will therefore c/w protonix (BID dosing for now), will keep patient NPO and will consult GI in AM  - Patient was evaluated by cardiology, will place patient on heparin gtt given her recent AFib ablation, will c/w home metoprolol and f/u further recs  - Patient's elevated troponin likely in setting of her recent ablation, downtrending currently, will f/u further cardiology recs  - Other care as per the assessment and plan above

## 2018-05-20 NOTE — CONSULT NOTE ADULT - ASSESSMENT
Impression:  1)Abdominal pain- suspect secondary to duodenitis or duodenal ulcer- unclear from imaging if evidence of duodenal perforation or duodenal diverticulum   2)Atrial fibrillation s/p ablation, on anticoagulation at home     Plan:  -Keen NPO for now  -will review imaging with radiology  -PPI IV BID  -Rivaroxaban on hold, currently on Heparin drip  -pending review of radiology may consider EGD Monday    Please call with questions  Soledad Jiménez  GI Fellow  Pager: [phone] Impression:  1)Abdominal pain- suspect secondary to duodenitis or duodenal ulcer- unclear from imaging if evidence of duodenal perforation or duodenal diverticulum- Imaging reviewed with radiology- unremarkable gallbladder and duodenum except for fluid and edema visualized in wall- suspect all secondary to pulmonary HTN and Right heart failure. Patient feels better with diuresis.   2)Atrial fibrillation s/p ablation, on anticoagulation at home     Plan:  -advance diet as tolerated   -PPI PO daily   -no plan for EGD at this time- low suspicion for intra-luminal duodenal pathology  -c/w diuresis as per cardiology  -if persistent pain- would consider HIDA scan; if pain improved can f/u with GI as outpatient     Please call with questions  Soledad Jiménez  GI Fellow  Pager: [phone]

## 2018-05-20 NOTE — H&P ADULT - PROBLEM SELECTOR PLAN 1
- Pt with duodenal outpouching on CT that appears to be due to a duodenal ulcer, likely from stress post-ablation  - With associated wall edema likely from congestive hepatopathy  - PPI gtt  - GI consult in AM, NPO for possible EGD, pain control - Pt with duodenal outpouching on CT that appears to be due to a duodenal ulcer  - With associated wall edema likely from congestive hepatopathy  - PPI BID  - GI consult in AM, NPO for possible EGD, pain control

## 2018-05-20 NOTE — H&P ADULT - PROBLEM SELECTOR PLAN 4
- Pt is rate controlled on Metoprolol succinate 100 QD, s/p ablation but with continued off-and-on palpitations  - Hold off on Eliquis at this time in lieu of possible surgery  - Monitor on telemetry - Pt is rate controlled on Metoprolol succinate 100 QD, s/p ablation but with continued off-and-on palpitations  - Hold off on Eliquis at this time in lieu of possible surgery  - Pt has mild troponin elevation that is common post ablation, without CK elevation, and troponins downtrending, no concern for ACS  - Monitor on telemetry - Pt is rate controlled on Metoprolol succinate 100 QD, s/p ablation but with continued off-and-on palpitations  - Hold off on Eliquis at this time in lieu of possible EGD, discussed with cardiology  - Pt has mild troponin elevation that is common post ablation, without CK elevation, and troponins downtrending, no concern for ACS  - Monitor on telemetry - Pt is rate controlled on Metoprolol succinate 100 QD, s/p ablation but with continued off-and-on palpitations  - Hold off on Eliquis at this time in lieu of possible EGD, discussed with cardiology, place on heparin gtt  - Pt has mild troponin elevation that is common post ablation, without CK elevation, and troponins downtrending, no concern for ACS  - Monitor on telemetry

## 2018-05-20 NOTE — CONSULT NOTE ADULT - ATTENDING COMMENTS
History/PE as noted above. Patient seen/examined. Complicated medical history noted including a history of VSD, pulmonary hypertension and atrial fibrillation status post ablation on 5/17/18. On 5/18 the patient experienced complaints of epigastric pain. No hematemesis, melena or any overt GI bleeding. Patient reports tolerating regular diet. Feels better today following treatment with furosemide and diuresis. PE/labs and CT as noted. At present, patient is comfortable, NAD and nontoxic appearing. Abdomen-mild right upper quadrant tenderness is elicited without rebound or guarding. Hepatomegaly is not appreciated. CT scan reviewed with radiology and cardiomegaly noted with features of right heart failure related to pulmonary hypertension. Gallbladder wall edema noted but no stones and fluid in the retroperitoneum surrounding the duodenum evident but no indication of duodenal perforation, duodenal diverticulitis or evident ulcer. As noted, patient feels significantly better following diuresis and abdominal pain possibly related to hepatic congestion associated with fluid administration at time of ablation on 5/17/18. Possibility of acalculous cholecystitis is considered although, as noted, patient currently feels improved with resolving pain and tolerance of p.o. intake. Recommendations as noted-continue PPI q.d. with ongoing optimization of cardiac status and diuretic therapy as per primary team and cardiac service. In event pain persists or increases in severity a HIDA scan is advised. At present, will defer EGD in view of clinical improvement.
Pt seen and examined.  Agree with resident eval and plan.  CT images reviewed.  No history of PUD in past and pt clinically overloaded with hepatic congestion on US.  Low clinical suspicion for duodenal ulcer.  PPI and GI eval for possible EGD.  Management as per Cardiology.  Doubt any surgical intervention.

## 2018-05-20 NOTE — H&P ADULT - ASSESSMENT
51 yo F with a PMH VSD s/p repair in childhood, atrial fibrillation s/p ablation 2003 and 5/17/18 and resultant PPM placement ([facility], 2003), Raynaud's, and disc herniation who presents with abdominal pain likely due to stress duodenal ulcer post-ablation. 53 yo F with a PMH VSD s/p repair in childhood, atrial fibrillation s/p ablation 2003 and 5/17/18 and resultant PPM placement ([facility], 2003), Raynaud's, and disc herniation who presents with abdominal pain likely due to duodenal ulcer.

## 2018-05-20 NOTE — CONSULT NOTE ADULT - SUBJECTIVE AND OBJECTIVE BOX
Chief Complaint:  Patient is a 52y old  Female who presents with a chief complaint of abdominal pain (20 May 2018 03:30)      HPI:  52F with VSD (s/p surgical repair), Atrial Fibrillation s/p ablation (most recent 5/17/18) on Eliquis, PPM, Raynaud's, Pulmonary hypertension presenting from home 5/20/18 with epigastric abdominal pain, acute in onset since ablation was performed on 5/17/18, no associated with nausea or vomiting. She denies any prior EGD or Colonoscopy. She denies fevers or chills. She denies NSAID use, she is on PPI therapy daily at home. She does take iron supplementation at home. She denies any melena or hematochezia.     Allergies:  No Known Allergies      Home Medications:  Home Medications:   * Patient Currently Takes Medications as of 20-May-2018 01:34 documented in Structured Notes  · 	pantoprazole 40 mg oral delayed release tablet: 1 tab(s) orally once a day (before a meal), Last Dose Taken:    · 	Eliquis 5 mg oral tablet: 1 tab(s) orally 2 times a day, Last Dose Taken:    · 	ZyrTEC 10 mg oral tablet: 1 tab(s) orally once a day, Last Dose Taken:    · 	metoprolol succinate 100 mg oral tablet, extended release: 1 tab(s) orally once a day, Last Dose Taken:    · 	gabapentin 300 mg oral capsule: 1 cap(s) orally once a day, Last Dose Taken:    · 	ferrous sulfate 325 mg (65 mg elemental iron) oral tablet: 1 tab(s) orally once a day, Last Dose Taken:    · 	biotin: Last Dose Taken:        Hospital Medications:  acetaminophen   Tablet. 650 milliGRAM(s) Oral every 6 hours PRN  ferrous    sulfate 325 milliGRAM(s) Oral daily  gabapentin 300 milliGRAM(s) Oral at bedtime  heparin  Infusion.  Unit(s)/Hr IV Continuous <Continuous>  heparin  Injectable 7000 Unit(s) IV Push every 6 hours PRN  heparin  Injectable 3500 Unit(s) IV Push every 6 hours PRN  loratadine 10 milliGRAM(s) Oral daily  metoprolol succinate  milliGRAM(s) Oral daily  morphine  - Injectable 2 milliGRAM(s) IV Push every 4 hours PRN  pantoprazole  Injectable 40 milliGRAM(s) IV Push two times a day      PMHX/PSHX:  Raynaud's phenomenon without gangrene  Irritable bowel syndrome, unspecified type  Blood clot in vein  Pulmonary HTN  Hemorrhoids, unspecified hemorrhoid type  Pacemaker  VSD (ventricular septal defect)  Anemia, unspecified type  Lumbosacral disc disease  Herniated disc, cervical  Atrial fibrillation, unspecified type  S/P VSD repair  Cardiac pacemaker      Family history:  Family history of systemic lupus erythematosus (Father)  Family history of acute myocardial infarction (Father)  Family history of heart attack      Social History: no tobacco, no ETOH, no illicit drug use     ROS:     General:  No wt loss, fevers, chills, night sweats, fatigue,   Eyes:  Good vision, no reported pain  ENT:  No sore throat, pain, runny nose, dysphagia  CV:  No pain, palpitations, hypo/hypertension  Resp:  No dyspnea, cough, tachypnea, wheezing  GI:  No pain, No nausea, No vomiting, No diarrhea, No constipation, No weight loss, No fever, No pruritis, No rectal bleeding, No tarry stools, No dysphagia,  :  No pain, bleeding, incontinence, nocturia  Muscle:  No pain, weakness  Neuro:  No weakness, tingling, memory problems  Psych:  No fatigue, insomnia, mood problems, depression  Endocrine:  No polyuria, polydipsia, cold/heat intolerance  Heme:  No petechiae, ecchymosis, easy bruisability  Skin:  No rash, tattoos, scars, edema      PHYSICAL EXAM:     GENERAL:  Appears stated age, well-groomed, well-nourished, no distress  HEENT:  NC/AT,  conjunctivae clear and pink, no thyromegaly, nodules, adenopathy, no JVD, sclera -anicteric  CHEST:  Full & symmetric excursion, no increased effort, breath sounds clear  HEART:  Regular rhythm, S1, S2, no murmur/rub/S3/S4, no abdominal bruit, no edema  ABDOMEN:  Soft, epigastric tenderness, non-distended, normoactive bowel sounds, no rebound, no guarding,  no masses ,no hepato-splenomegaly, no signs of chronic liver disease  EXTEREMITIES:  no cyanosis,clubbing or edema  SKIN:  No rash/erythema/ecchymoses/petechiae/wounds/abscess/warm/dry  NEURO:  Alert, oriented, no asterixis, no tremor, no encephalopathy    Vital Signs:  Vital Signs Last 24 Hrs  T(C): 36.8 (20 May 2018 05:40), Max: 36.8 (19 May 2018 16:14)  T(F): 98.3 (20 May 2018 05:40), Max: 98.3 (20 May 2018 05:40)  HR: 60 (20 May 2018 05:40) (60 - 70)  BP: 107/68 (20 May 2018 06:43) (97/62 - 133/72)  BP(mean): --  RR: 18 (20 May 2018 05:40) (18 - 22)  SpO2: 94% (20 May 2018 05:40) (92% - 100%)  Daily Height in cm: 170.18 (20 May 2018 01:11)    Daily     LABS:                        10.4   8.89  )-----------( 207      ( 20 May 2018 05:59 )             32.6     Mean Cell Volume: 93.9 fL (05-20-18 @ 05:59)    05-20    141  |  103  |  15  ----------------------------<  138<H>  4.3   |  25  |  0.85    Ca    8.4      20 May 2018 05:59  Phos  3.9     05-20  Mg     2.2     05-20    TPro  5.9<L>  /  Alb  3.5  /  TBili  0.4  /  DBili  x   /  AST  39<H>  /  ALT  66<H>  /  AlkPhos  61  05-20    LIVER FUNCTIONS - ( 20 May 2018 05:59 )  Alb: 3.5 g/dL / Pro: 5.9 g/dL / ALK PHOS: 61 u/L / ALT: 66 u/L / AST: 39 u/L / GGT: x           PT/INR - ( 19 May 2018 17:00 )   PT: 12.3 SEC;   INR: 1.07          PTT - ( 19 May 2018 17:00 )  PTT:29.0 SEC    Amylase Serum--      Lipase serum65.7       Ammonia--                          10.4   8.89  )-----------( 207      ( 20 May 2018 05:59 )             32.6                         10.9   8.71  )-----------( 217      ( 19 May 2018 17:00 )             34.0                         9.5    9.94  )-----------( 168      ( 18 May 2018 05:00 )             29.2                         10.5   10.15 )-----------( 195      ( 17 May 2018 17:30 )             31.3     Imaging:    < from: CT Angio Abdomen and Pelvis w/ IV Cont (05.19.18 @ 18:24) >  EXAM:  CT ANGIO ABD PELV (W)AW IC      EXAM:  CT ANGIO CHEST (W)AW IC        PROCEDURE DATE:  May 19 2018         INTERPRETATION:  CLINICAL INFORMATION: Chest pain, shortness of breath,   status post recent ablation, history of pulmonary hypertension and   pacemaker placement    COMPARISON: None.    PROCEDURE:   CT Angiography of the Chest, Abdomen and Pelvis.   Precontrast imaging was performed through the chest followed by arterial   phase imaging of the chest, abdomen and pelvis.  Intravenous contrast: 90 ml Omnipaque 350. 10 ml discarded.  Oral contrast:None.  Sagittal and coronal reformats were performed as well as 3D (MIP)   reconstructions.    FINDINGS:    CHEST:     LUNGS AND LARGE AIRWAYS: Patent central airways. No pulmonary nodules.   Mild compressive and subsegmental linear atelectasis.  PLEURA: Small bilateral pleural effusions, right greater than left.  VESSELS: Within normal limits.  HEART: Heart size is enlarged. No pericardial effusion.  MEDIASTINUM AND KEZIA: No lymphadenopathy.  CHEST WALL AND LOWER NECK: Status post median sternotomy. Left-sided   pacemaker with lead tips in the right atrium and right ventricle.    ABDOMEN AND PELVIS:    LIVER: Within normal limits.  BILE DUCTS: Normal caliber.  GALLBLADDER: Marked circumferential gallbladder wall thickening and   pericholecystic fluid.    SPLEEN: Within normal limits.  PANCREAS: Within normal limits.  ADRENALS: Within normal limits.  KIDNEYS/URETERS: Within normal limits.    BLADDER: Within normal limits.  REPRODUCTIVE ORGANS: The uterus and adnexa are within normal limits.    BOWEL: There is mild wall thickening of the first and second portions of   the duodenum with surrounding inflammatory changes and fluid. Focal   outpouching of air in the first portion of the duodenum (3-143)   reflecting duodenal ulcer versus diverticulum. Additional smaller   diverticulum is noted in the horizontal portion of the diverticulum, just   left of midline (61-46). No bowel obstruction. Colonic diverticulosis.   Appendix is normal.  PERITONEUM: Moderate pelvic ascites.    VESSELS:  No aortic aneurysm or dissection. Right hepatic artery   originates from the SMA.  RETROPERITONEUM: Multiple subcentimeter mesenteric and retroperitoneal   lymph nodes.    ABDOMINAL WALL: Tiny fat-containing umbilical hernia.  BONES: Mild degenerative changes of the spine.    IMPRESSION:    No aortic dissection.    Proximal duodenal wall thickening surrounding inflammatory changes and   ascites from duodenitis with possible perforated duodenal ulcer. Focal   outpouching of air in the first portion the duodenum reflecting duodenal   ulcer versus diverticulum.    Significant circumferential gallbladder wall thickening and edema may   reflect associated cholecystitis. Recommend right upper quadrant   ultrasound for further evaluation.    Small to moderate right greater than left pleural effusions, interlobular   septal thickening and cardiomegaly indicating mild vascular   congestion/interstitial edema.    Recommend surgical consultation.    < end of copied text >

## 2018-05-20 NOTE — H&P ADULT - NSHPOUTPATIENTPROVIDERS_GEN_ALL_CORE
Dr. Harpal Saenz, Cardiology ([phone], cell)  Dr. Rubén Singh, EP ([phone])  Dr. Elsie Lutz, PCP ([phone])

## 2018-05-20 NOTE — PROGRESS NOTE ADULT - ASSESSMENT
51 yo F with a PMH VSD s/p repair in childhood, atrial fibrillation s/p ablation 2003 and 5/17/18 and resultant PPM placement ([facility], 2003), Raynaud's, and disc herniation who presents with abdominal pain likely due to duodenal ulcer, s/p afib ablation on 5/17/18.    PLAN: Patient appears non-toxic, hemodynamically stable, no fever, no white count. Continues with epigastric pain and abdominal rigidity in the epigastric region. During her ablation, IVF were given, CXR and CT scan shows small to mod bilateral effusion, would recommend another lasix 20mg IVP and further diuresis to decrease symptoms. CHADSVASC is 2, holding eliquis for now until GI makes recommendations regarding an EGD or further work up. Would proceed with heparin gtt for now.  Rate controlled with toprol xl 100. GI consultation is pending.  Continue protonix BID and start carafate 1gram [id number].

## 2018-05-20 NOTE — CHART NOTE - NSCHARTNOTEFT_GEN_A_CORE
Per GI will defer EGD in view of clinical improvement, will d/c heparin gtt and restart on Eliquis. Plan discussed with hospitalist on call Dr. Perez and Cardiology NP ([phone]).

## 2018-05-20 NOTE — PROGRESS NOTE ADULT - PROBLEM SELECTOR PLAN 6
- DVT PPX: on Eliquis, will hold for now in lieu of possible procedure, will place on heparin gtt  - Diet: NPO for now  - Dispo: pending GI workup/procedure - DVT PPX: on Eliquis, will hold for now in lieu of possible procedure, will place on heparin gtt (full AC)  - Diet: discuss with GI NPO vs clears at this time

## 2018-05-20 NOTE — PATIENT PROFILE ADULT. - URINARY CATHETER
Detail Level: Zone Comment: History of skin cancers Comment: slightly atypical Detail Level: Detailed no

## 2018-05-20 NOTE — PROGRESS NOTE ADULT - PROBLEM SELECTOR PLAN 3
- Patient's symptoms of abdominal pain, likely stem from duodenal ulcer and congestive hepatopathy from underlying right heart failure and pulmonary hypertension  - Pt not in respiratory or cardiac distress, but pt with signs of fluid overload, will give Lasix 20 and monitor - Patient's symptoms of abdominal pain, likely stem from duodenal ulcer and congestive hepatopathy from underlying right heart failure and pulmonary hypertension  - Pt not in respiratory or cardiac distress, but pt with signs of fluid overload, monitor, hold IVF

## 2018-05-20 NOTE — PROGRESS NOTE ADULT - PROBLEM SELECTOR PLAN 2
- Patient has positive Robison's sign, and has gall bladder wall thickening suggestive of acalculous cholecystitis. However, pt nontoxic appearing, without fever or WBC  - Bilirubin normal, mild LFT elevation most likely 2/2 congestive hepatopathy  - Recheck LFTs in the morning, surgery following. GI input in AM  - Hold off further abx - Patient has positive Rboison's sign, and has gall bladder wall thickening suggestive of acalculous cholecystitis. However, pt nontoxic appearing, without fever or WBC  - Bilirubin normal, mild LFT elevation could be 2/2 congestive hepatopathy; not worsening  - appreciate surgery recs

## 2018-05-20 NOTE — H&P ADULT - NSHPPHYSICALEXAM_GEN_ALL_CORE
GENERAL: No acute distress  HEENT: PERRL, EOMI, MMM, no oropharyngeal lesions  NECK: supple, no stiffness, no JVD, no thyromegaly  PULM: respirations non-labored, bilateral lower lobe rales. No rhonchi or wheezes  CV: regular rate and rhythm, no murmurs, gallops, or rubs  GI: abdomen epigastric TTP, + Robison's. Soft, nondistended, no masses felt, normal bowel sounds  MSK: strength 5/5 bilateral upper/lower extremities. No joint swelling, erythema, or warmth. Bilateral  (R>L) 9th rib costochondral TTP  LYMPH: no anterior cervical, posterior cervical, supraclavicular, or inguinal lymphadenopathy  NEURO: A&Ox3, no tremors, sensation intact  SKIN: + nonpitting LE edema. No rashes or lesions Vital Signs Last 24 Hrs  T(C): 36.8 (20 May 2018 05:40), Max: 36.8 (19 May 2018 16:14)  T(F): 98.3 (20 May 2018 05:40), Max: 98.3 (20 May 2018 05:40)  HR: 60 (20 May 2018 05:40) (60 - 70)  BP: 97/62 (20 May 2018 05:40) (97/62 - 133/72)  BP(mean): --  RR: 18 (20 May 2018 05:40) (18 - 22)  SpO2: 94% (20 May 2018 05:40) (92% - 100%)    GENERAL: No acute distress  HEENT: PERRL, EOMI, MMM, no oropharyngeal lesions  NECK: supple, no stiffness, no JVD, no thyromegaly  PULM: respirations non-labored, bilateral lower lobe rales. No rhonchi or wheezes  CV: regular rate and rhythm, no murmurs, gallops, or rubs  GI: abdomen epigastric TTP, + Robison's. Soft, nondistended, no masses felt, normal bowel sounds  MSK: strength 5/5 bilateral upper/lower extremities. No joint swelling, erythema, or warmth. Bilateral  (R>L) 9th rib costochondral TTP  LYMPH: no anterior cervical, posterior cervical, supraclavicular, or inguinal lymphadenopathy  NEURO: A&Ox3, no tremors, sensation intact  SKIN: + nonpitting LE edema. No rashes or lesions

## 2018-05-20 NOTE — H&P ADULT - NSHPREVIEWOFSYSTEMS_GEN_ALL_CORE
Gen: negative for fevers, chills, weight loss, weight gain, malaise, fatigue  Eyes: no blurred vision or lacrimation  ENT: no vertigo or decreased hearing  Resp: pleuritic chest pain. No wheezing, dyspnea, hemoptysis, or orthopnea  CV: + palpitations. No chest pain or dyspnea on exertion  GI: + abdominal pain. No nausea, vomiting, diarrhea, constipation, melena, or hematochezia  : no dysuria, hematuria, or incontinence  MSK: no arthralgias, joint swelling, or myalgias  Neuro: no focal deficits, confusion, weakness, dizziness, tremors, or seizures  Skin: + edema. No rash or lesions Gen: negative for fevers, chills, weight loss, weight gain, malaise, fatigue  Eyes: no blurred vision or lacrimation  ENT: no vertigo or decreased hearing  Resp: pleuritic chest pain. No wheezing, dyspnea, hemoptysis, or orthopnea  CV: + palpitations. No chest pain or dyspnea on exertion  GI: + abdominal pain. No nausea, vomiting, diarrhea, constipation, melena, or hematochezia  : no dysuria, hematuria, or incontinence  MSK: + edema. no arthralgias, joint swelling, or myalgias  Neuro: no focal deficits, confusion, weakness, dizziness, tremors, or seizures  Skin: No rash or lesions

## 2018-05-20 NOTE — H&P ADULT - PROBLEM SELECTOR PLAN 3
- Patient's symptoms of abdominal pain, likely stem from congestive hepatopathy from underlying right heart failure and pulmonary hypertension  - Would recommend - Patient's symptoms of abdominal pain, likely stem from congestive hepatopathy from underlying right heart failure and pulmonary hypertension  - Pt not in respiratory or cardiac distress, would however, consider 20 PO Lasix - Patient's symptoms of abdominal pain, likely stem from congestive hepatopathy from underlying right heart failure and pulmonary hypertension  - Pt not in respiratory or cardiac distress, but pt with signs of fluid overload, will give Lasix 20 and see - Patient's symptoms of abdominal pain, likely stem from congestive hepatopathy from underlying right heart failure and pulmonary hypertension  - Pt not in respiratory or cardiac distress, but pt with signs of fluid overload, will give Lasix 20 and monitor - Patient's symptoms of abdominal pain, likely stem from duodenal ulcer and congestive hepatopathy from underlying right heart failure and pulmonary hypertension  - Pt not in respiratory or cardiac distress, but pt with signs of fluid overload, will give Lasix 20 and monitor

## 2018-05-20 NOTE — PROGRESS NOTE ADULT - ASSESSMENT
52 year old female presenting with epigastric and RUQ pain following cardiac ablation, ultrasound demonstrated hepatic congestion and patient clinically appears fluid overloaded    - No acute surgical intervention at this time   - would consider GI consultation for endoscopic evaluation  - appreciate cardiology regarding optimization of R heart dysfunction and fluid management  - patient may benefit from ulcer prophylaxis with IV protonix and/or PO sulcralfate  -please reconsult as needed if any acute changes clinically    B Team [phone]

## 2018-05-20 NOTE — H&P ADULT - PROBLEM SELECTOR PLAN 2
- Patient has positive Robison's sign, and has gall bladder wall thickening suggestive of acalculous cholecystitis. However, pt nontoxic appearing, without fever or WBC  - Bilirubin normal, mild LFT elevation most likely 2/2 congestive hepatopathy  - Recheck LFTs in the morning, surgery following - Patient has positive Robison's sign, and has gall bladder wall thickening suggestive of acalculous cholecystitis. However, pt nontoxic appearing, without fever or WBC  - Bilirubin normal, mild LFT elevation most likely 2/2 congestive hepatopathy  - Recheck LFTs in the morning, surgery following. GI input in AM  - C/w abx for now - Patient has positive Robison's sign, and has gall bladder wall thickening suggestive of acalculous cholecystitis. However, pt nontoxic appearing, without fever or WBC  - Bilirubin normal, mild LFT elevation most likely 2/2 congestive hepatopathy  - Recheck LFTs in the morning, surgery following. GI input in AM  - Hold off further abx

## 2018-05-20 NOTE — PROGRESS NOTE ADULT - SUBJECTIVE AND OBJECTIVE BOX
Date of Admission:  5/20/18  24 hour events: Admitted from the ER for possible perforated duodenal ulcer and gallbladder disease-no immediate surgical intervention recommended  CT angio chest/abd/pelvis done yesterday. Patient remains with a rigid epigastric region and complains of swelling. No longer has chest pain. Was given lasix 20mg IVP at 6am. Was started on a heparin gtt early this morning.  Vital Signs Last 24 Hrs  T(C): 36.8 (20 May 2018 05:40), Max: 36.8 (19 May 2018 16:14)  T(F): 98.3 (20 May 2018 05:40), Max: 98.3 (20 May 2018 05:40)  HR: 60 (20 May 2018 05:40) (60 - 70)  BP: 107/68 (20 May 2018 06:43) (97/62 - 133/72)  BP(mean): --  RR: 18 (20 May 2018 05:40) (18 - 22)  SpO2: 94% (20 May 2018 05:40) (92% - 100%)  I&O's Summary      MEDICATIONS:  heparin  Infusion.  Unit(s)/Hr IV Continuous <Continuous>  heparin  Injectable 7000 Unit(s) IV Push every 6 hours PRN  heparin  Injectable 3500 Unit(s) IV Push every 6 hours PRN  metoprolol succinate  milliGRAM(s) Oral daily      loratadine 10 milliGRAM(s) Oral daily    acetaminophen   Tablet. 650 milliGRAM(s) Oral every 6 hours PRN  gabapentin 300 milliGRAM(s) Oral at bedtime  morphine  - Injectable 2 milliGRAM(s) IV Push every 4 hours PRN    pantoprazole  Injectable 40 milliGRAM(s) IV Push two times a day      ferrous    sulfate 325 milliGRAM(s) Oral daily      REVIEW OF SYSTEMS:  Chest: negative for CP  Lungs:+SOB  Abdomen:+Nausea  + epigastric pain  Extremities: +swelling of extremities    PHYSICAL EXAM:  General: NAD  Cardiovascular: Normal S1 S2, No JVD, No murmurs, No edema  Respiratory: decreased breath sounds with crackles 	  Gastrointestinal: +epigastric tenderness and rigidity, otherwise  Soft, Non-tender, in 4 quadrants,+ BS 	  Skin: warm and dry,"puffy skin"  Extremities: slight edema  Vascular: Peripheral pulses palpable 2+ bilaterally    LABS:	 	    CBC Full  -  ( 20 May 2018 05:59 )  WBC Count : 8.89 K/uL  Hemoglobin : 10.4 g/dL  Hematocrit : 32.6 %  Platelet Count - Automated : 207 K/uL  Mean Cell Volume : 93.9 fL  Mean Cell Hemoglobin : 30.0 pg  Mean Cell Hemoglobin Concentration : 31.9 %  Auto Neutrophil # : 6.16 K/uL  Auto Lymphocyte # : 1.58 K/uL  Auto Monocyte # : 0.73 K/uL  Auto Eosinophil # : 0.12 K/uL  Auto Basophil # : 0.05 K/uL  Auto Neutrophil % : 69.3 %  Auto Lymphocyte % : 17.8 %  Auto Monocyte % : 8.2 %  Auto Eosinophil % : 1.3 %  Auto Basophil % : 0.6 %    05-20    141  |  103  |  15  ----------------------------<  138<H>  4.3   |  25  |  0.85  05-19    142  |  105  |  16  ----------------------------<  89  3.8   |  26  |  0.87    Ca    8.4      20 May 2018 05:59  Ca    8.4      19 May 2018 17:00  Phos  3.9     05-20  Mg     2.2     05-20    TPro  5.9<L>  /  Alb  3.5  /  TBili  0.4  /  DBili  x   /  AST  39<H>  /  ALT  66<H>  /  AlkPhos  61  05-20  TPro  6.2  /  Alb  3.5  /  TBili  0.3  /  DBili  x   /  AST  56<H>  /  ALT  77<H>  /  AlkPhos  67  05-19  < from: CT Angio Chest w/ IV Cont (05.19.18 @ 18:24) >  IMPRESSION:    No aortic dissection.    Proximal duodenal wall thickening surrounding inflammatory changes and   ascites from duodenitis with possible perforated duodenal ulcer. Focal   outpouching of air in the first portion the duodenum reflecting duodenal   ulcer versus diverticulum.    Significant circumferential gallbladder wall thickening and edema may   reflect associated cholecystitis. Recommend right upper quadrant   ultrasound for further evaluation.    Small to moderate right greater than left pleural effusions, interlobular   septal thickening and cardiomegaly indicating mild vascular   congestion/interstitial edema.    Recommend surgical consultation.    Findings and recommendations were discussed with Dr. Batsheva Montemayor at   8:50 PM on 5/19/2018 with     < end of copied text >  < from: US Abdomen Limited (05.19.18 @ 22:53) >  Circumferential gallbladder wall thickening and edema may be due to   adjacent hepatic parenchymal disease/passive congestion. Acalculus   cholecystitis remains in the differential.  Hepatic steatosis.    < end of copied text >  < from: US Abdomen Limited (05.19.18 @ 22:53) >  Circumferential gallbladder wall thickening and edema may be due to   adjacent hepatic parenchymal disease/passive congestion. Acalculus   cholecystitis remains in the differential.  Hepatic steatosis.    < end of copied text >

## 2018-05-20 NOTE — PROGRESS NOTE ADULT - PROBLEM SELECTOR PLAN 1
- Pt with duodenal outpouching on CT that appears to be due to a duodenal ulcer  - With associated wall edema likely from congestive hepatopathy  - PPI BID  - GI consult in AM, NPO for possible EGD, pain control - Pt with duodenal outpouching on CT that appears to be due to a duodenal ulcer  - With associated wall edema likely from congestive hepatopathy  - PPI BID  - f/u GI recs for possible EGD

## 2018-05-20 NOTE — PROGRESS NOTE ADULT - PROBLEM SELECTOR PLAN 4
- Pt is rate controlled on Metoprolol succinate 100 QD, s/p ablation but with continued off-and-on palpitations  - Hold off on Eliquis at this time in lieu of possible EGD, discussed with cardiology, place on heparin gtt  - Pt has mild troponin elevation that is common post ablation, without CK elevation, and troponins downtrending, no concern for ACS  - Monitor on telemetry - Pt is rate controlled on Metoprolol succinate 100 QD, s/p ablation but with continued off-and-on palpitations  - Hold off on Eliquis at this time in lieu of possible EGD, per cardiology, place on heparin gtt  - Pt has mild troponin elevation that is common post ablation, without CK elevation, and troponins downtrending, no concern for ACS  - Monitor on telemetry

## 2018-05-20 NOTE — H&P ADULT - FAMILY HISTORY
Father  Still living? Unknown  Family history of acute myocardial infarction, Age at diagnosis: Age Unknown  Family history of systemic lupus erythematosus, Age at diagnosis: Age Unknown

## 2018-05-20 NOTE — H&P ADULT - PMH
Atrial fibrillation, unspecified type  diagnosed 14 years ago, on Eliquis, had ablation in 2003, DCCV in 2003  Blood clot in vein  behind PPM ??? diagnosed in 2016, was on Eliquis  Hemorrhoids, unspecified hemorrhoid type    Herniated disc, cervical    Irritable bowel syndrome, unspecified type    Lumbosacral disc disease    Pacemaker  placed in 2003, replaced x 4 times, last  in 10/2016, [employer]  Pulmonary HTN    Raynaud's phenomenon without gangrene    VSD (ventricular septal defect)  treated surgically in 1971

## 2018-05-21 ENCOUNTER — RX RENEWAL (OUTPATIENT)
Age: 53
End: 2018-05-21

## 2018-05-21 ENCOUNTER — TRANSCRIPTION ENCOUNTER (OUTPATIENT)
Age: 53
End: 2018-05-21

## 2018-05-21 VITALS
OXYGEN SATURATION: 97 % | HEART RATE: 67 BPM | DIASTOLIC BLOOD PRESSURE: 51 MMHG | SYSTOLIC BLOOD PRESSURE: 112 MMHG | RESPIRATION RATE: 18 BRPM

## 2018-05-21 LAB
BUN SERPL-MCNC: 14 MG/DL — SIGNIFICANT CHANGE UP (ref 7–23)
CALCIUM SERPL-MCNC: 8.8 MG/DL — SIGNIFICANT CHANGE UP (ref 8.4–10.5)
CHLORIDE SERPL-SCNC: 102 MMOL/L — SIGNIFICANT CHANGE UP (ref 98–107)
CO2 SERPL-SCNC: 28 MMOL/L — SIGNIFICANT CHANGE UP (ref 22–31)
CREAT SERPL-MCNC: 0.79 MG/DL — SIGNIFICANT CHANGE UP (ref 0.5–1.3)
GLUCOSE SERPL-MCNC: 106 MG/DL — HIGH (ref 70–99)
HCT VFR BLD CALC: 31.8 % — LOW (ref 34.5–45)
HGB BLD-MCNC: 10.3 G/DL — LOW (ref 11.5–15.5)
MAGNESIUM SERPL-MCNC: 2.1 MG/DL — SIGNIFICANT CHANGE UP (ref 1.6–2.6)
MCHC RBC-ENTMCNC: 29.6 PG — SIGNIFICANT CHANGE UP (ref 27–34)
MCHC RBC-ENTMCNC: 32.4 % — SIGNIFICANT CHANGE UP (ref 32–36)
MCV RBC AUTO: 91.4 FL — SIGNIFICANT CHANGE UP (ref 80–100)
NRBC # FLD: 0 — SIGNIFICANT CHANGE UP
PHOSPHATE SERPL-MCNC: 5 MG/DL — HIGH (ref 2.5–4.5)
PLATELET # BLD AUTO: 214 K/UL — SIGNIFICANT CHANGE UP (ref 150–400)
PMV BLD: 9.6 FL — SIGNIFICANT CHANGE UP (ref 7–13)
POTASSIUM SERPL-MCNC: 4.1 MMOL/L — SIGNIFICANT CHANGE UP (ref 3.5–5.3)
POTASSIUM SERPL-SCNC: 4.1 MMOL/L — SIGNIFICANT CHANGE UP (ref 3.5–5.3)
RBC # BLD: 3.48 M/UL — LOW (ref 3.8–5.2)
RBC # FLD: 13.3 % — SIGNIFICANT CHANGE UP (ref 10.3–14.5)
SODIUM SERPL-SCNC: 142 MMOL/L — SIGNIFICANT CHANGE UP (ref 135–145)
WBC # BLD: 8.85 K/UL — SIGNIFICANT CHANGE UP (ref 3.8–10.5)
WBC # FLD AUTO: 8.85 K/UL — SIGNIFICANT CHANGE UP (ref 3.8–10.5)

## 2018-05-21 PROCEDURE — 99239 HOSP IP/OBS DSCHRG MGMT >30: CPT

## 2018-05-21 PROCEDURE — 99232 SBSQ HOSP IP/OBS MODERATE 35: CPT | Mod: GC

## 2018-05-21 RX ADMIN — APIXABAN 5 MILLIGRAM(S): 2.5 TABLET, FILM COATED ORAL at 11:19

## 2018-05-21 RX ADMIN — Medication 650 MILLIGRAM(S): at 08:04

## 2018-05-21 RX ADMIN — PANTOPRAZOLE SODIUM 40 MILLIGRAM(S): 20 TABLET, DELAYED RELEASE ORAL at 06:18

## 2018-05-21 RX ADMIN — Medication 650 MILLIGRAM(S): at 06:18

## 2018-05-21 RX ADMIN — Medication 325 MILLIGRAM(S): at 11:19

## 2018-05-21 NOTE — DISCHARGE NOTE ADULT - CARE PROVIDER_API CALL
Harpal Saenz (MD), Cardiovascular Disease; Internal Medicine  [address] [place] [address]  [address] [place] [address]  Phone: [phone]  Fax: [phone]    Rubén Singh), Cardiac Electrophysiology; Cardiovascular Disease; Internal Medicine  [address] [place] [address]  [address] [place] [address]  Phone: [phone]  Fax: [phone]    Dr. Lloyd Fishman,   Phone: (   )    -  Fax: (   )    -

## 2018-05-21 NOTE — PROGRESS NOTE ADULT - SUBJECTIVE AND OBJECTIVE BOX
Chief Complaint:  Patient is a 52y old  Female who presents with a chief complaint of abdominal pain (20 May 2018 03:30)      Interval Events:  -patient tolerated diet without any abdominal pain, nausea, vomiting, fevers, chills   -abdominal pain is much improved with diuresis  -denies chest pain or palpitations     HPI:  52F with VSD (s/p surgical repair), Atrial Fibrillation s/p ablation (most recent 5/17/18) on Eliquis, PPM, Raynaud's, Pulmonary hypertension presenting from home 5/20/18 with epigastric abdominal pain, acute in onset since ablation was performed on 5/17/18, no associated with nausea or vomiting. She denies any prior EGD or Colonoscopy. She denies fevers or chills. She denies NSAID use, she is on PPI therapy daily at home. She does take iron supplementation at home. She denies any melena or hematochezia.     Allergies:  No Known Allergies      Home Medications:  Home Medications:   * Patient Currently Takes Medications as of 20-May-2018 01:34 documented in Structured Notes  · 	pantoprazole 40 mg oral delayed release tablet: 1 tab(s) orally once a day (before a meal), Last Dose Taken:    · 	Eliquis 5 mg oral tablet: 1 tab(s) orally 2 times a day, Last Dose Taken:    · 	ZyrTEC 10 mg oral tablet: 1 tab(s) orally once a day, Last Dose Taken:    · 	metoprolol succinate 100 mg oral tablet, extended release: 1 tab(s) orally once a day, Last Dose Taken:    · 	gabapentin 300 mg oral capsule: 1 cap(s) orally once a day, Last Dose Taken:    · 	ferrous sulfate 325 mg (65 mg elemental iron) oral tablet: 1 tab(s) orally once a day, Last Dose Taken:    · 	biotin: Last Dose Taken:        Hospital Medications:  acetaminophen   Tablet. 650 milliGRAM(s) Oral every 6 hours PRN  ferrous    sulfate 325 milliGRAM(s) Oral daily  gabapentin 300 milliGRAM(s) Oral at bedtime  heparin  Infusion.  Unit(s)/Hr IV Continuous <Continuous>  heparin  Injectable 7000 Unit(s) IV Push every 6 hours PRN  heparin  Injectable 3500 Unit(s) IV Push every 6 hours PRN  loratadine 10 milliGRAM(s) Oral daily  metoprolol succinate  milliGRAM(s) Oral daily  morphine  - Injectable 2 milliGRAM(s) IV Push every 4 hours PRN  pantoprazole  Injectable 40 milliGRAM(s) IV Push two times a day      PMHX/PSHX:  Raynaud's phenomenon without gangrene  Irritable bowel syndrome, unspecified type  Blood clot in vein  Pulmonary HTN  Hemorrhoids, unspecified hemorrhoid type  Pacemaker  VSD (ventricular septal defect)  Anemia, unspecified type  Lumbosacral disc disease  Herniated disc, cervical  Atrial fibrillation, unspecified type  S/P VSD repair  Cardiac pacemaker      Family history:  Family history of systemic lupus erythematosus (Father)  Family history of acute myocardial infarction (Father)  Family history of heart attack      Social History: no tobacco, no ETOH, no illicit drug use     ROS:     General:  No wt loss, fevers, chills, night sweats, fatigue,   Eyes:  Good vision, no reported pain  ENT:  No sore throat, pain, runny nose, dysphagia  CV:  No pain, palpitations, hypo/hypertension  Resp:  No dyspnea, cough, tachypnea, wheezing  GI:  No pain, No nausea, No vomiting, No diarrhea, No constipation, No weight loss, No fever, No pruritis, No rectal bleeding, No tarry stools, No dysphagia,  :  No pain, bleeding, incontinence, nocturia  Muscle:  No pain, weakness  Neuro:  No weakness, tingling, memory problems  Psych:  No fatigue, insomnia, mood problems, depression  Endocrine:  No polyuria, polydipsia, cold/heat intolerance  Heme:  No petechiae, ecchymosis, easy bruisability  Skin:  No rash, tattoos, scars, edema      PHYSICAL EXAM:     GENERAL:  Appears stated age, well-groomed, well-nourished, no distress  HEENT:  NC/AT,  conjunctivae clear and pink, no thyromegaly, nodules, adenopathy, no JVD, sclera -anicteric  CHEST:  Full & symmetric excursion, no increased effort, breath sounds clear  HEART:  Regular rhythm, S1, S2, no murmur/rub/S3/S4, no abdominal bruit, no edema  ABDOMEN:  Soft, non-tender, non-distended, normoactive bowel sounds, no rebound, no guarding,  no masses ,no hepato-splenomegaly, no signs of chronic liver disease  EXTEREMITIES:  no cyanosis,clubbing or edema  SKIN:  No rash/erythema/ecchymoses/petechiae/wounds/abscess/warm/dry  NEURO:  Alert, oriented, no asterixis, no tremor, no encephalopathy      Vital Signs:  Vital Signs Last 24 Hrs  T(C): 36.9 (21 May 2018 06:09), Max: 37.2 (20 May 2018 22:18)  T(F): 98.5 (21 May 2018 06:09), Max: 99 (20 May 2018 22:18)  HR: 72 (21 May 2018 06:09) (61 - 72)  BP: 99/62 (21 May 2018 06:09) (99/62 - 107/57)  BP(mean): --  RR: 18 (21 May 2018 06:09) (16 - 18)  SpO2: 96% (21 May 2018 06:09) (96% - 96%)  Daily     Daily     LABS:                        10.3   8.85  )-----------( 214      ( 21 May 2018 06:25 )             31.8     05-21    142  |  102  |  14  ----------------------------<  106<H>  4.1   |  28  |  0.79    Ca    8.8      21 May 2018 06:25  Phos  5.0     05-21  Mg     2.1     05-21    TPro  5.9<L>  /  Alb  3.5  /  TBili  0.4  /  DBili  x   /  AST  39<H>  /  ALT  66<H>  /  AlkPhos  61  05-20    LIVER FUNCTIONS - ( 20 May 2018 05:59 )  Alb: 3.5 g/dL / Pro: 5.9 g/dL / ALK PHOS: 61 u/L / ALT: 66 u/L / AST: 39 u/L / GGT: x           PT/INR - ( 19 May 2018 17:00 )   PT: 12.3 SEC;   INR: 1.07          PTT - ( 20 May 2018 14:41 )  PTT:141.5 SEC      Imaging:    < from: CT Angio Abdomen and Pelvis w/ IV Cont (05.19.18 @ 18:24) >  EXAM:  CT ANGIO ABD PELV (W)AW IC      EXAM:  CT ANGIO CHEST (W)AW IC        PROCEDURE DATE:  May 19 2018         INTERPRETATION:  CLINICAL INFORMATION: Chest pain, shortness of breath,   status post recent ablation, history of pulmonary hypertension and   pacemaker placement    COMPARISON: None.    PROCEDURE:   CT Angiography of the Chest, Abdomen and Pelvis.   Precontrast imaging was performed through the chest followed by arterial   phase imaging of the chest, abdomen and pelvis.  Intravenous contrast: 90 ml Omnipaque 350. 10 ml discarded.  Oral contrast:None.  Sagittal and coronal reformats were performed as well as 3D (MIP)   reconstructions.    FINDINGS:    CHEST:     LUNGS AND LARGE AIRWAYS: Patent central airways. No pulmonary nodules.   Mild compressive and subsegmental linear atelectasis.  PLEURA: Small bilateral pleural effusions, right greater than left.  VESSELS: Within normal limits.  HEART: Heart size is enlarged. No pericardial effusion.  MEDIASTINUM AND KEZIA: No lymphadenopathy.  CHEST WALL AND LOWER NECK: Status post median sternotomy. Left-sided   pacemaker with lead tips in the right atrium and right ventricle.    ABDOMEN AND PELVIS:    LIVER: Within normal limits.  BILE DUCTS: Normal caliber.  GALLBLADDER: Marked circumferential gallbladder wall thickening and   pericholecystic fluid.    SPLEEN: Within normal limits.  PANCREAS: Within normal limits.  ADRENALS: Within normal limits.  KIDNEYS/URETERS: Within normal limits.    BLADDER: Within normal limits.  REPRODUCTIVE ORGANS: The uterus and adnexa are within normal limits.    BOWEL: There is mild wall thickening of the first and second portions of   the duodenum with surrounding inflammatory changes and fluid. Focal   outpouching of air in the first portion of the duodenum (3-143)   reflecting duodenal ulcer versus diverticulum. Additional smaller   diverticulum is noted in the horizontal portion of the diverticulum, just   left of midline (61-46). No bowel obstruction. Colonic diverticulosis.   Appendix is normal.  PERITONEUM: Moderate pelvic ascites.    VESSELS:  No aortic aneurysm or dissection. Right hepatic artery   originates from the SMA.  RETROPERITONEUM: Multiple subcentimeter mesenteric and retroperitoneal   lymph nodes.    ABDOMINAL WALL: Tiny fat-containing umbilical hernia.  BONES: Mild degenerative changes of the spine.    IMPRESSION:    No aortic dissection.    Proximal duodenal wall thickening surrounding inflammatory changes and   ascites from duodenitis with possible perforated duodenal ulcer. Focal   outpouching of air in the first portion the duodenum reflecting duodenal   ulcer versus diverticulum.    Significant circumferential gallbladder wall thickening and edema may   reflect associated cholecystitis. Recommend right upper quadrant   ultrasound for further evaluation.    Small to moderate right greater than left pleural effusions, interlobular   septal thickening and cardiomegaly indicating mild vascular   congestion/interstitial edema.    Recommend surgical consultation.    < end of copied text >

## 2018-05-21 NOTE — PROGRESS NOTE ADULT - SUBJECTIVE AND OBJECTIVE BOX
Patient feeling significantly better.  Denies abdominal pain,  nausea or vomiting, chest pain, palpitations.  Tolerating po intake well. Wants to go home.    Vital Signs Last 24 Hrs  T(C): 36.9 (21 May 2018 06:09), Max: 37.2 (20 May 2018 22:18)  T(F): 98.5 (21 May 2018 06:09), Max: 99 (20 May 2018 22:18)  HR: 72 (21 May 2018 06:09) (61 - 72)  BP: 99/62 (21 May 2018 06:09) (99/62 - 107/57)  BP(mean): --  RR: 18 (21 May 2018 06:09) (16 - 18)  SpO2: 96% (21 May 2018 06:09) (96% - 96%)      EKG  Telemetry  MEDICATIONS  (STANDING):  apixaban 5 milliGRAM(s) Oral every 12 hours  ferrous    sulfate 325 milliGRAM(s) Oral daily  gabapentin 300 milliGRAM(s) Oral at bedtime  loratadine 10 milliGRAM(s) Oral daily  metoprolol succinate  milliGRAM(s) Oral daily  pantoprazole    Tablet 40 milliGRAM(s) Oral before breakfast    MEDICATIONS  (PRN):  acetaminophen   Tablet. 650 milliGRAM(s) Oral every 6 hours PRN Mild and Moderate Pain  morphine  - Injectable 2 milliGRAM(s) IV Push every 4 hours PRN Severe Pain (7 - 10)          Physical exam:   Gen- Well developed.  Well nourished. NAD  Resp- Clear to auscultation.  NO wheezing, rales or rhonchi  CV- S1 and S2 normal . RRR.  ABD- soft nontender +bowel sounds.  Mild distention  EXT- no edema or calf tenderness.  Neuro- grossly nonfocal                            10.3   8.85  )-----------( 214      ( 21 May 2018 06:25 )             31.8     PT/INR - ( 19 May 2018 17:00 )   PT: 12.3 SEC;   INR: 1.07          PTT - ( 20 May 2018 14:41 )  PTT:141.5 SEC  05-21    142  |  102  |  14  ----------------------------<  106<H>  4.1   |  28  |  0.79    Ca    8.8      21 May 2018 06:25  Phos  5.0     05-21  Mg     2.1     05-21    TPro  5.9<L>  /  Alb  3.5  /  TBili  0.4  /  DBili  x   /  AST  39<H>  /  ALT  66<H>  /  AlkPhos  61  05-20    CARDIAC MARKERS ( 20 May 2018 05:59 )  x     / 0.28 ng/mL / 63 u/L / 1.89 ng/mL / x      CARDIAC MARKERS ( 20 May 2018 01:38 )  x     / 0.36 ng/mL / 74 u/L / 2.20 ng/mL / x      CARDIAC MARKERS ( 19 May 2018 17:00 )  x     / 0.39 ng/mL / x     / x     / x Patient feeling significantly better.  Denies abdominal pain,  nausea or vomiting, chest pain, palpitations.  Tolerating po intake well. Wants to go home.    Vital Signs Last 24 Hrs  T(C): 36.9 (21 May 2018 06:09), Max: 37.2 (20 May 2018 22:18)  T(F): 98.5 (21 May 2018 06:09), Max: 99 (20 May 2018 22:18)  HR: 72 (21 May 2018 06:09) (61 - 72)  BP: 99/62 (21 May 2018 06:09) (99/62 - 107/57)  BP(mean): --  RR: 18 (21 May 2018 06:09) (16 - 18)  SpO2: 96% (21 May 2018 06:09) (96% - 96%)      EKG:  Atrial paced.  Incomplete RBBB. QRS 122ms   QTc 460ms  Telemetry:  APaced with intermittent ventricular pacing    MEDICATIONS  (STANDING):  apixaban 5 milliGRAM(s) Oral every 12 hours  ferrous    sulfate 325 milliGRAM(s) Oral daily  gabapentin 300 milliGRAM(s) Oral at bedtime  loratadine 10 milliGRAM(s) Oral daily  metoprolol succinate  milliGRAM(s) Oral daily  pantoprazole    Tablet 40 milliGRAM(s) Oral before breakfast    MEDICATIONS  (PRN):  acetaminophen   Tablet. 650 milliGRAM(s) Oral every 6 hours PRN Mild and Moderate Pain  morphine  - Injectable 2 milliGRAM(s) IV Push every 4 hours PRN Severe Pain (7 - 10)          Physical exam:   Gen- Well developed.  Well nourished. NAD  Resp- Clear to auscultation.  NO wheezing, rales or rhonchi  CV- S1 and S2 normal . RRR.  ABD- soft nontender +bowel sounds.  Mild distention  EXT- no edema or calf tenderness.  Neuro- grossly nonfocal                            10.3   8.85  )-----------( 214      ( 21 May 2018 06:25 )             31.8     PT/INR - ( 19 May 2018 17:00 )   PT: 12.3 SEC;   INR: 1.07          PTT - ( 20 May 2018 14:41 )  PTT:141.5 SEC  05-21    142  |  102  |  14  ----------------------------<  106<H>  4.1   |  28  |  0.79    Ca    8.8      21 May 2018 06:25  Phos  5.0     05-21  Mg     2.1     05-21    TPro  5.9<L>  /  Alb  3.5  /  TBili  0.4  /  DBili  x   /  AST  39<H>  /  ALT  66<H>  /  AlkPhos  61  05-20    CARDIAC MARKERS ( 20 May 2018 05:59 )  x     / 0.28 ng/mL / 63 u/L / 1.89 ng/mL / x      CARDIAC MARKERS ( 20 May 2018 01:38 )  x     / 0.36 ng/mL / 74 u/L / 2.20 ng/mL / x      CARDIAC MARKERS ( 19 May 2018 17:00 )  x     / 0.39 ng/mL / x     / x     / x

## 2018-05-21 NOTE — DISCHARGE NOTE ADULT - CARE PLAN
Principal Discharge DX:	Epigastric pain  Goal:	resolved  Assessment and plan of treatment:	Continue current meds  F/U with PMD in 1 week  Secondary Diagnosis:	Atrial fibrillation, unspecified type  Goal:	To restore or maintain a normal heart rate and rhythm, to prevent blood clots, and decrease the risks of stroke CVA/TIA.  Assessment and plan of treatment:	Please take your medications as prescribed.  Continue to take your blood thinner as prescribed and follow with your physician to monitor your levels.  Low fat diet, reduce caffeine intake, and exercise at least 30 minutes daily.  Secondary Diagnosis:	Duodenal ulcer  Assessment and plan of treatment:	Continue current meds  F/U with your PMD in 1 week  Secondary Diagnosis:	Hepatic congestion  Assessment and plan of treatment:	Dr. Singh called in Torsemide for you.  Take as prescribed.  Follow up as scheduled

## 2018-05-21 NOTE — DISCHARGE NOTE ADULT - OTHER SIGNIFICANT FINDINGS
H&H 10.4/32.6  Trop 0.39->0.36->0.28                   CK neg x 3  Lipase- 65.7    CTA [place]/abd/pel: No aortic dissection.Proximal duodenal wall thickening surrounding inflammatory changes and ascites from duodenitis with possible perforated duodenal ulcer. Focal outpouching of air in the first portion the duodenum reflecting duodenal   ulcer versus diverticulum.Significant circumferential gallbladder wall thickening and edema may reflect associated cholecystitis. Recommend right upper quadrant ultrasound for further evaluation.Small to moderate right greater than left pleural effusions, interlobular   septal thickening and cardiomegaly indicating mild vascular congestion/interstitial edema. Recommend surgical consultation.    US abdomen: Circumferential gallbladder wall thickening and edema may be due to adjacent hepatic parenchymal disease/passive congestion. Acalculus cholecystitis remains in the differential.Hepatic steatosis. CTA [place]/abd/pel: No aortic dissection.Proximal duodenal wall thickening surrounding inflammatory changes and ascites from duodenitis with possible perforated duodenal ulcer. Focal outpouching of air in the first portion the duodenum reflecting duodenal   ulcer versus diverticulum.Significant circumferential gallbladder wall thickening and edema may reflect associated cholecystitis. Recommend right upper quadrant ultrasound for further evaluation.Small to moderate right greater than left pleural effusions, interlobular   septal thickening and cardiomegaly indicating mild vascular congestion/interstitial edema. Recommend surgical consultation.    US abdomen: Circumferential gallbladder wall thickening and edema may be due to adjacent hepatic parenchymal disease/passive congestion. Acalculus cholecystitis remains in the differential.Hepatic steatosis.

## 2018-05-21 NOTE — DISCHARGE NOTE ADULT - MEDICATION SUMMARY - MEDICATIONS TO TAKE
I will START or STAY ON the medications listed below when I get home from the hospital:    Eliquis 5 mg oral tablet  -- 1 tab(s) by mouth 2 times a day  -- Indication: For Atrial fibrillation, unspecified type    gabapentin 300 mg oral capsule  -- 1 cap(s) by mouth once a day  -- Indication: For neuropathy    ZyrTEC 10 mg oral tablet  -- 1 tab(s) by mouth once a day  -- Indication: For Allergies    metoprolol succinate 100 mg oral tablet, extended release  -- 1 tab(s) by mouth once a day  -- Indication: For HTN    ferrous sulfate 325 mg (65 mg elemental iron) oral tablet  -- 1 tab(s) by mouth once a day  -- Indication: For Anemia    pantoprazole 40 mg oral delayed release tablet  -- 1 tab(s) by mouth once a day (before a meal)  -- Indication: For GERD    biotin  -- Indication: For supplement

## 2018-05-21 NOTE — PROGRESS NOTE ADULT - ASSESSMENT
53 yo F with a PMH VSD s/p repair in childhood, atrial fibrillation s/p ablation 2003 and 5/17/18 and  PPM placement ([facility], 2003), Raynaud's, and disc herniation who presents with abdominal pain likely due to fluid overload.  Treated with diuretic therapy with resolving pain and tolerance of po intake.   PLAN:  Would discharge to home.              Will  take torsemide at home (called into pharmacy by Dr. Singh).             Has followup appointment with Dr. Singh in 2 weeks.

## 2018-05-21 NOTE — DISCHARGE NOTE ADULT - PATIENT PORTAL LINK FT
You can access the [facility] Patient Portal, offered by [facility], by registering with the following website: [email or web address]

## 2018-05-21 NOTE — DISCHARGE NOTE ADULT - PROVIDER TOKENS
TOKEN:'[id number]:'[id number]',FREE:[LAST:[Dr. Lloyd Fishman],PHONE:[(   )    -],FAX:[(   )    -]]

## 2018-05-21 NOTE — DISCHARGE NOTE ADULT - HOSPITAL COURSE
51 yo F with a PMH VSD s/p repair in childhood, atrial fibrillation s/p ablation 2003 and 5/17/18 and resultant PPM placement ([place], 2003), Raynaud's, and disc herniation who presents with abdominal pain likely due to stress duodenal ulcer post-ablation.    +R/O Duodenal Ulcer- clinically improving; per GI will defer EGD at this time  +Afib - Heparin gtt started ; Eliquis restarted since no plan for EGD  +S/p Afib Ablation on 5/17/18  +PPM  +Congestive Hepatopathy- s/p Lasix with improvement    5/21/18 Pt. cleared for DC at this time.  D/W EP/attending. 53 yo F with a PMH VSD s/p repair in childhood, atrial fibrillation s/p ablation 2003, PPM placement ([facility], 2003), Raynaud's, and disc herniation, S/p recurrent afib ablation on 5/17/18,  who was admitted  with abdominal pain likely due to suspected stress duodenal ulcer post-ablation.    S/p  CTA Chest and abdomen that showed: No aortic dissection.Proximal duodenal wall thickening surrounding inflammatory changes and ascites from duodenitis with possible perforated duodenal ulcer. Focal outpouching of air in the first portion the duodenum reflecting duodenal ulcer versus diverticulum. Significant circumferential gallbladder wall thickening and edema may reflect associated cholecystitis. Small to moderate right greater than left pleural effusions, interlobular septal thickening and cardiomegaly indicating mild vascular congestion/interstitial edema. Patient then underwent Abd U/S to address possible cholecystis seen on CT that showed : Circumferential gallbladder wall thickening and edema may be due to adjacent hepatic parenchymal disease/passive congestion. Acalculus cholecystitis remains in the differential. Hepatic steatosis.    During course patient was followed by GI. Pt was evaluated by surgery w/ recs for no acute intervention. Pt  was initially started on antibiotics however  it was stopped after imaging was reviewed with radiology w/ conclusion of unremarkable gallbladder and duodenum except for fluid and edema visualized in wall which was suspicious for all secondary to pulmonary HTN and Right heart failure. Pt was also initially on hep ggt which was switched back to eliquis once no intervention by surgery and GI was decided based on overall findings.  Patient was diuresed during course with improvement and resolution of symptoms. She was followed by cardiology and EP as well.      5/21/18 Pt. optimized and stable  for DC at this time which was d/w  EP and medicine attending.

## 2018-05-21 NOTE — DISCHARGE NOTE ADULT - CARE PROVIDERS DIRECT ADDRESSES
,[email or web address]

## 2018-05-21 NOTE — PROGRESS NOTE ADULT - ASSESSMENT
Impression:  1)Abdominal pain- suspect secondary to duodenitis or duodenal ulcer- unclear from imaging if evidence of duodenal perforation or duodenal diverticulum- Imaging reviewed with radiology- unremarkable gallbladder and duodenum except for fluid and edema visualized in wall- suspect all secondary to pulmonary HTN and Right heart failure. Patient feels better with diuresis.   2)Atrial fibrillation s/p ablation, on anticoagulation at home     Plan:  -advance diet as tolerated   -PPI PO daily   -c/w diuresis as per cardiology  -f/u with GI as outpatient ([phone])     Please call with questions  Soledad Jiménez  GI Fellow  Pager: [phone]

## 2018-05-21 NOTE — DISCHARGE NOTE ADULT - PLAN OF CARE
resolved Continue current meds  F/U with PMD in 1 week To restore or maintain a normal heart rate and rhythm, to prevent blood clots, and decrease the risks of stroke CVA/TIA. Please take your medications as prescribed.  Continue to take your blood thinner as prescribed and follow with your physician to monitor your levels.  Low fat diet, reduce caffeine intake, and exercise at least 30 minutes daily. Continue current meds  F/U with your PMD in 1 week Dr. Singh called in Torsemide for you.  Take as prescribed.  Follow up as scheduled

## 2018-05-21 NOTE — CHART NOTE - NSCHARTNOTEFT_GEN_A_CORE
Patient seen and examined personally by me today.   Vitals, Imaging and labs reviewed.   Spoke with patient at length about her hospital course and follow up instructions. Pt will f/u with GI as outpatient ([phone]). She will also follow up with her cardiologist and EP Dr. Singh with in 2 weeks. Spoke w/ cardiology and patient will also take torsemide at home which was called into the pharmacy by Dr. Singh. Discharge Note with complete instructions. Spent 34 minutes in discharge planning and coordination.

## 2018-05-21 NOTE — DISCHARGE NOTE ADULT - CONDITIONS AT DISCHARGE
AOx4, OOB self, no distress noted.  Denies pain or discomfort.  IV removed.  Discharge paperwork discussed with patient, education provided.

## 2018-06-05 ENCOUNTER — APPOINTMENT (OUTPATIENT)
Dept: ELECTROPHYSIOLOGY | Facility: CLINIC | Age: 53
End: 2018-06-05
Payer: COMMERCIAL

## 2018-06-05 VITALS
HEART RATE: 60 BPM | BODY MASS INDEX: 27.13 KG/M2 | SYSTOLIC BLOOD PRESSURE: 113 MMHG | OXYGEN SATURATION: 98 % | HEIGHT: 68 IN | WEIGHT: 179 LBS | DIASTOLIC BLOOD PRESSURE: 61 MMHG

## 2018-06-05 DIAGNOSIS — Z98.890 OTHER SPECIFIED POSTPROCEDURAL STATES: ICD-10-CM

## 2018-06-05 DIAGNOSIS — Z86.79 OTHER SPECIFIED POSTPROCEDURAL STATES: ICD-10-CM

## 2018-06-05 PROCEDURE — 93000 ELECTROCARDIOGRAM COMPLETE: CPT | Mod: 59

## 2018-06-05 PROCEDURE — 99214 OFFICE O/P EST MOD 30 MIN: CPT | Mod: 25

## 2018-06-05 PROCEDURE — 93280 PM DEVICE PROGR EVAL DUAL: CPT

## 2018-06-05 RX ORDER — BROMPHENIRAMINE MALEATE, PSEUDOEPHEDRINE HYDROCHLORIDE, 2; 30; 10 MG/5ML; MG/5ML; MG/5ML
30-2-10 SYRUP ORAL
Qty: 400 | Refills: 0 | Status: DISCONTINUED | COMMUNITY
Start: 2018-01-25

## 2018-06-05 RX ORDER — OSELTAMIVIR PHOSPHATE 75 MG/1
75 CAPSULE ORAL
Qty: 10 | Refills: 0 | Status: DISCONTINUED | COMMUNITY
Start: 2018-01-25

## 2018-06-05 RX ORDER — TRANEXAMIC ACID 650 MG/1
650 TABLET ORAL
Qty: 30 | Refills: 0 | Status: DISCONTINUED | COMMUNITY
Start: 2018-03-29

## 2018-06-05 RX ORDER — AZITHROMYCIN 250 MG/1
250 TABLET, FILM COATED ORAL
Qty: 6 | Refills: 0 | Status: DISCONTINUED | COMMUNITY
Start: 2018-01-25

## 2018-06-05 RX ORDER — METOPROLOL SUCCINATE 25 MG/1
25 TABLET, EXTENDED RELEASE ORAL
Qty: 90 | Refills: 0 | Status: DISCONTINUED | COMMUNITY
Start: 2018-05-05 | End: 2018-06-05

## 2018-06-05 RX ORDER — PREDNISONE 5 MG/1
5 TABLET ORAL
Qty: 100 | Refills: 0 | Status: DISCONTINUED | COMMUNITY
Start: 2018-01-25

## 2018-06-05 RX ORDER — MEDROXYPROGESTERONE ACETATE 150 MG/ML
150 INJECTION, SUSPENSION INTRAMUSCULAR
Qty: 1 | Refills: 0 | Status: DISCONTINUED | COMMUNITY
Start: 2018-03-29 | End: 2018-06-05

## 2018-07-10 ENCOUNTER — APPOINTMENT (OUTPATIENT)
Dept: ELECTROPHYSIOLOGY | Facility: CLINIC | Age: 53
End: 2018-07-10
Payer: COMMERCIAL

## 2018-07-10 ENCOUNTER — NON-APPOINTMENT (OUTPATIENT)
Age: 53
End: 2018-07-10

## 2018-07-10 VITALS
OXYGEN SATURATION: 93 % | DIASTOLIC BLOOD PRESSURE: 76 MMHG | WEIGHT: 173 LBS | SYSTOLIC BLOOD PRESSURE: 112 MMHG | BODY MASS INDEX: 26.22 KG/M2 | HEART RATE: 64 BPM | HEIGHT: 68 IN

## 2018-07-10 PROCEDURE — 93000 ELECTROCARDIOGRAM COMPLETE: CPT

## 2018-07-10 PROCEDURE — 99213 OFFICE O/P EST LOW 20 MIN: CPT

## 2018-07-16 ENCOUNTER — RX RENEWAL (OUTPATIENT)
Age: 53
End: 2018-07-16

## 2018-07-24 ENCOUNTER — TRANSCRIPTION ENCOUNTER (OUTPATIENT)
Age: 53
End: 2018-07-24

## 2018-10-09 ENCOUNTER — APPOINTMENT (OUTPATIENT)
Dept: ELECTROPHYSIOLOGY | Facility: CLINIC | Age: 53
End: 2018-10-09
Payer: COMMERCIAL

## 2018-10-09 ENCOUNTER — NON-APPOINTMENT (OUTPATIENT)
Age: 53
End: 2018-10-09

## 2018-10-09 VITALS — OXYGEN SATURATION: 96 %

## 2018-10-09 VITALS — DIASTOLIC BLOOD PRESSURE: 70 MMHG | HEART RATE: 63 BPM | SYSTOLIC BLOOD PRESSURE: 109 MMHG

## 2018-10-09 DIAGNOSIS — Z86.79 OTHER SPECIFIED POSTPROCEDURAL STATES: ICD-10-CM

## 2018-10-09 DIAGNOSIS — Z98.890 OTHER SPECIFIED POSTPROCEDURAL STATES: ICD-10-CM

## 2018-10-09 PROCEDURE — 93280 PM DEVICE PROGR EVAL DUAL: CPT

## 2018-10-09 PROCEDURE — 93000 ELECTROCARDIOGRAM COMPLETE: CPT | Mod: 59

## 2018-10-09 PROCEDURE — 99213 OFFICE O/P EST LOW 20 MIN: CPT

## 2018-12-06 ENCOUNTER — RX RENEWAL (OUTPATIENT)
Age: 53
End: 2018-12-06

## 2018-12-28 ENCOUNTER — RX RENEWAL (OUTPATIENT)
Age: 53
End: 2018-12-28

## 2019-01-25 ENCOUNTER — RX RENEWAL (OUTPATIENT)
Age: 54
End: 2019-01-25

## 2019-04-16 ENCOUNTER — NON-APPOINTMENT (OUTPATIENT)
Age: 54
End: 2019-04-16

## 2019-04-16 ENCOUNTER — APPOINTMENT (OUTPATIENT)
Dept: ELECTROPHYSIOLOGY | Facility: CLINIC | Age: 54
End: 2019-04-16
Payer: COMMERCIAL

## 2019-04-16 VITALS — DIASTOLIC BLOOD PRESSURE: 70 MMHG | SYSTOLIC BLOOD PRESSURE: 120 MMHG | RESPIRATION RATE: 14 BRPM | HEART RATE: 62 BPM

## 2019-04-16 VITALS
DIASTOLIC BLOOD PRESSURE: 70 MMHG | SYSTOLIC BLOOD PRESSURE: 120 MMHG | RESPIRATION RATE: 16 BRPM | HEIGHT: 68 IN | BODY MASS INDEX: 25.76 KG/M2 | HEART RATE: 65 BPM | OXYGEN SATURATION: 98 % | WEIGHT: 170 LBS

## 2019-04-16 PROCEDURE — 93280 PM DEVICE PROGR EVAL DUAL: CPT

## 2019-04-16 PROCEDURE — 99213 OFFICE O/P EST LOW 20 MIN: CPT

## 2019-04-16 PROCEDURE — 93000 ELECTROCARDIOGRAM COMPLETE: CPT | Mod: 59

## 2019-04-16 RX ORDER — METOPROLOL TARTRATE 100 MG/1
100 TABLET ORAL DAILY
Refills: 0 | Status: DISCONTINUED | COMMUNITY
Start: 2018-03-20 | End: 2019-04-16

## 2019-04-16 RX ORDER — APIXABAN 5 MG/1
5 TABLET, FILM COATED ORAL
Qty: 60 | Refills: 5 | Status: DISCONTINUED | COMMUNITY
Start: 2018-03-20 | End: 2019-04-16

## 2019-04-16 RX ORDER — TORSEMIDE 20 MG/1
20 TABLET ORAL EVERY OTHER DAY
Qty: 30 | Refills: 2 | Status: DISCONTINUED | COMMUNITY
Start: 2018-05-21 | End: 2019-04-16

## 2019-04-16 NOTE — PHYSICAL EXAM
[General Appearance - Well Developed] : well developed [Normal Appearance] : normal appearance [Well Groomed] : well groomed [General Appearance - Well Nourished] : well nourished [No Deformities] : no deformities [General Appearance - In No Acute Distress] : no acute distress [Normal Conjunctiva] : the conjunctiva exhibited no abnormalities [Eyelids - No Xanthelasma] : the eyelids demonstrated no xanthelasmas [Normal Oral Mucosa] : normal oral mucosa [No Oral Cyanosis] : no oral cyanosis [No Oral Pallor] : no oral pallor [Normal Jugular Venous A Waves Present] : normal jugular venous A waves present [No Jugular Venous Goldstein A Waves] : no jugular venous goldstein A waves [Normal Jugular Venous V Waves Present] : normal jugular venous V waves present [Exaggerated Use Of Accessory Muscles For Inspiration] : no accessory muscle use [Auscultation Breath Sounds / Voice Sounds] : lungs were clear to auscultation bilaterally [Respiration, Rhythm And Depth] : normal respiratory rhythm and effort [Heart Sounds] : normal S1 and S2 [Heart Rate And Rhythm] : heart rate and rhythm were normal [Abdomen Soft] : soft [Murmurs] : no murmurs present [Abdomen Mass (___ Cm)] : no abdominal mass palpated [Abdomen Tenderness] : non-tender [Abnormal Walk] : normal gait [Gait - Sufficient For Exercise Testing] : the gait was sufficient for exercise testing [Nail Clubbing] : no clubbing of the fingernails [Petechial Hemorrhages (___cm)] : no petechial hemorrhages [Cyanosis, Localized] : no localized cyanosis [] : no rash [Skin Color & Pigmentation] : normal skin color and pigmentation [Skin Lesions] : no skin lesions [No Venous Stasis] : no venous stasis [No Skin Ulcers] : no skin ulcer [No Xanthoma] : no  xanthoma was observed [Oriented To Time, Place, And Person] : oriented to person, place, and time [Affect] : the affect was normal [Mood] : the mood was normal [No Anxiety] : not feeling anxious
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
## 2019-05-14 ENCOUNTER — RESULT REVIEW (OUTPATIENT)
Age: 54
End: 2019-05-14

## 2019-05-21 NOTE — DISCHARGE NOTE ADULT - NSTOBACCONEVERSMOKERY/N_GEN_A
Regional Procedure  Technique:  Combined Spinal Epidural  Labor and Delivery Epidural:  Yes    Patient Preparation  Location: Labor and Delivery  Preanesthetic Checklist:  Patient Identified, Risks and Benefits Discussed, Patient &/Or Fetus Hemodynamically Stable, Timeout Performed and Monitors and Equipment Checked  Patient Position:  Sitting  Sedation:  None    Monitors Used:  NIBP, Pulse Oximetry, Tocodynamometer and FHT's  Oxygen Supplement:  Room Air  Prep:  Chloraprep, Sterile Gloves, Cap and Mask, Sterile Drape and Chlorhexidine with alcohol  Max Sterile Barrier Technique:  Hand Washing, Cap/Mask, Sterile gloves, Sterile drape and Sterile dressing applied  Securement:  Transparent dressing    Regional Basics  Local Infiltration:  1% Lidocaine  Local Infiltration Volume:  2mL  Approach:  Midline    Block Details  X-Ray Used:  No  Loss of resistance at:  4.5cm  with:  Saline  Epidural needle type:  Tuohy  Epidural needle gauge:  17  Spinal Needle Type: Juana Lan  Spinal Needle Gauge: 26  Spinal Needle Length: 5.0 in  CSF obtained.  Catheter Tip Location:  9cm at skin  Tunnel:  No  Test Dose:  Lidocaine 1.5% w/Epi    Intrathecal Narcotics    Labor Regional Notes    Regional Anesthesia Note         No

## 2019-08-27 ENCOUNTER — RX CHANGE (OUTPATIENT)
Age: 54
End: 2019-08-27

## 2019-09-24 ENCOUNTER — RX RENEWAL (OUTPATIENT)
Age: 54
End: 2019-09-24

## 2019-12-16 ENCOUNTER — TRANSCRIPTION ENCOUNTER (OUTPATIENT)
Age: 54
End: 2019-12-16

## 2019-12-27 ENCOUNTER — APPOINTMENT (OUTPATIENT)
Dept: INTERNAL MEDICINE | Facility: CLINIC | Age: 54
End: 2019-12-27
Payer: COMMERCIAL

## 2019-12-27 VITALS
RESPIRATION RATE: 12 BRPM | HEIGHT: 67 IN | DIASTOLIC BLOOD PRESSURE: 70 MMHG | WEIGHT: 196 LBS | HEART RATE: 71 BPM | BODY MASS INDEX: 30.76 KG/M2 | TEMPERATURE: 99.1 F | SYSTOLIC BLOOD PRESSURE: 105 MMHG | OXYGEN SATURATION: 98 %

## 2019-12-27 DIAGNOSIS — K58.9 IRRITABLE BOWEL SYNDROME W/OUT DIARRHEA: ICD-10-CM

## 2019-12-27 PROCEDURE — 99386 PREV VISIT NEW AGE 40-64: CPT

## 2019-12-27 NOTE — HISTORY OF PRESENT ILLNESS
[de-identified] :  54 year old female   with Hx of open heart surgery in infancy (VSD repair), paroxysmal AT s/p ablation x3  a / resultant PPM placement,pt currently off AC x 2 years / Pulmonary hypertension  who presents today for annual physical and to establish care . \par She is doing well, denies chest pain, palpitations, SOB, syncope, + mild MORSE > pt following with cardiologist / EP specialist regularly.\par Admits not been physically active \par PAP/ mammo 2019 \par colonoscopy  @ 50

## 2019-12-27 NOTE — PHYSICAL EXAM
[Well Nourished] : well nourished [No Acute Distress] : no acute distress [Well-Appearing] : well-appearing [Well Developed] : well developed [EOMI] : extraocular movements intact [Normal Sclera/Conjunctiva] : normal sclera/conjunctiva [Normal Outer Ear/Nose] : the outer ears and nose were normal in appearance [Normal Oropharynx] : the oropharynx was normal [Normal TMs] : both tympanic membranes were normal [No JVD] : no jugular venous distention [No Lymphadenopathy] : no lymphadenopathy [Supple] : supple [No Respiratory Distress] : no respiratory distress  [No Accessory Muscle Use] : no accessory muscle use [Regular Rhythm] : with a regular rhythm [Clear to Auscultation] : lungs were clear to auscultation bilaterally [Normal Rate] : normal rate  [Normal S1, S2] : normal S1 and S2 [No Carotid Bruits] : no carotid bruits [No Edema] : there was no peripheral edema [Pedal Pulses Present] : the pedal pulses are present [Soft] : abdomen soft [Non Tender] : non-tender [Non-distended] : non-distended [No Masses] : no abdominal mass palpated [No HSM] : no HSM [Normal Bowel Sounds] : normal bowel sounds [Normal Posterior Cervical Nodes] : no posterior cervical lymphadenopathy [Normal Anterior Cervical Nodes] : no anterior cervical lymphadenopathy [No Spinal Tenderness] : no spinal tenderness [No CVA Tenderness] : no CVA  tenderness [No Rash] : no rash [No Joint Swelling] : no joint swelling [Grossly Normal Strength/Tone] : grossly normal strength/tone [Coordination Grossly Intact] : coordination grossly intact [No Focal Deficits] : no focal deficits [Normal Gait] : normal gait [Normal Affect] : the affect was normal [Normal Insight/Judgement] : insight and judgment were intact [de-identified] : + HATTIE 2/6 / + PPM

## 2019-12-27 NOTE — PLAN
[FreeTextEntry1] : 1. see plan\par 2. Paroxysmal A.fib/ Pulmonary hypertension\par pt following with cardiologist \par continue current meds

## 2020-01-12 LAB
25(OH)D3 SERPL-MCNC: 24.9 NG/ML
ALBUMIN SERPL ELPH-MCNC: 4.7 G/DL
ALP BLD-CCNC: 89 U/L
ALT SERPL-CCNC: 48 U/L
ANION GAP SERPL CALC-SCNC: 14 MMOL/L
APPEARANCE: CLEAR
AST SERPL-CCNC: 27 U/L
BACTERIA: NEGATIVE
BASOPHILS # BLD AUTO: 0.04 K/UL
BASOPHILS NFR BLD AUTO: 0.5 %
BILIRUB SERPL-MCNC: 0.9 MG/DL
BILIRUBIN URINE: NEGATIVE
BLOOD URINE: NEGATIVE
BUN SERPL-MCNC: 19 MG/DL
CALCIUM SERPL-MCNC: 9.8 MG/DL
CHLORIDE SERPL-SCNC: 95 MMOL/L
CHOLEST SERPL-MCNC: 185 MG/DL
CHOLEST/HDLC SERPL: 3.1 RATIO
CO2 SERPL-SCNC: 32 MMOL/L
COLOR: YELLOW
CREAT SERPL-MCNC: 0.98 MG/DL
EOSINOPHIL # BLD AUTO: 0.14 K/UL
EOSINOPHIL NFR BLD AUTO: 1.9 %
ESTIMATED AVERAGE GLUCOSE: 134 MG/DL
FERRITIN SERPL-MCNC: 98 NG/ML
GLUCOSE QUALITATIVE U: NEGATIVE
GLUCOSE SERPL-MCNC: 82 MG/DL
HBA1C MFR BLD HPLC: 6.3 %
HCT VFR BLD CALC: 43.7 %
HDLC SERPL-MCNC: 59 MG/DL
HGB BLD-MCNC: 13.9 G/DL
HYALINE CASTS: 1 /LPF
IMM GRANULOCYTES NFR BLD AUTO: 2 %
IRON SATN MFR SERPL: 25 %
IRON SERPL-MCNC: 79 UG/DL
KETONES URINE: NEGATIVE
LDLC SERPL CALC-MCNC: 97 MG/DL
LEUKOCYTE ESTERASE URINE: NEGATIVE
LYMPHOCYTES # BLD AUTO: 1.73 K/UL
LYMPHOCYTES NFR BLD AUTO: 23 %
MAN DIFF?: NORMAL
MCHC RBC-ENTMCNC: 28.7 PG
MCHC RBC-ENTMCNC: 31.8 GM/DL
MCV RBC AUTO: 90.1 FL
MICROSCOPIC-UA: NORMAL
MONOCYTES # BLD AUTO: 0.86 K/UL
MONOCYTES NFR BLD AUTO: 11.4 %
NEUTROPHILS # BLD AUTO: 4.61 K/UL
NEUTROPHILS NFR BLD AUTO: 61.2 %
NITRITE URINE: NEGATIVE
PH URINE: 6
PLATELET # BLD AUTO: 249 K/UL
POTASSIUM SERPL-SCNC: 3.4 MMOL/L
PROT SERPL-MCNC: 7.4 G/DL
PROTEIN URINE: NEGATIVE
RBC # BLD: 4.85 M/UL
RBC # FLD: 13.3 %
RED BLOOD CELLS URINE: 3 /HPF
SODIUM SERPL-SCNC: 141 MMOL/L
SPECIFIC GRAVITY URINE: 1.02
SQUAMOUS EPITHELIAL CELLS: 3 /HPF
TIBC SERPL-MCNC: 312 UG/DL
TRIGL SERPL-MCNC: 143 MG/DL
TSH SERPL-ACNC: 4.27 UIU/ML
UIBC SERPL-MCNC: 233 UG/DL
UROBILINOGEN URINE: NORMAL
VIT B12 SERPL-MCNC: 447 PG/ML
WBC # FLD AUTO: 7.53 K/UL
WHITE BLOOD CELLS URINE: 1 /HPF

## 2020-01-17 ENCOUNTER — TRANSCRIPTION ENCOUNTER (OUTPATIENT)
Age: 55
End: 2020-01-17

## 2020-09-19 ENCOUNTER — EMERGENCY (EMERGENCY)
Facility: HOSPITAL | Age: 55
LOS: 1 days | Discharge: HOME | End: 2020-09-19
Attending: EMERGENCY MEDICINE
Payer: COMMERCIAL

## 2020-09-19 VITALS
SYSTOLIC BLOOD PRESSURE: 120 MMHG | HEART RATE: 67 BPM | OXYGEN SATURATION: 100 % | DIASTOLIC BLOOD PRESSURE: 70 MMHG | RESPIRATION RATE: 18 BRPM

## 2020-09-19 VITALS
TEMPERATURE: 98 F | OXYGEN SATURATION: 99 % | WEIGHT: 184.97 LBS | SYSTOLIC BLOOD PRESSURE: 113 MMHG | HEIGHT: 67 IN | HEART RATE: 67 BPM | DIASTOLIC BLOOD PRESSURE: 76 MMHG | RESPIRATION RATE: 19 BRPM

## 2020-09-19 DIAGNOSIS — Z98.890 OTHER SPECIFIED POSTPROCEDURAL STATES: Chronic | ICD-10-CM

## 2020-09-19 DIAGNOSIS — Z95.0 PRESENCE OF CARDIAC PACEMAKER: Chronic | ICD-10-CM

## 2020-09-19 LAB
ALBUMIN SERPL ELPH-MCNC: 4.5 G/DL — SIGNIFICANT CHANGE UP (ref 3.3–5)
ALP SERPL-CCNC: 71 U/L — SIGNIFICANT CHANGE UP (ref 40–120)
ALT FLD-CCNC: 26 U/L — SIGNIFICANT CHANGE UP (ref 10–45)
ANION GAP SERPL CALC-SCNC: 10 MMOL/L — SIGNIFICANT CHANGE UP (ref 5–17)
APTT BLD: 24.9 SEC — LOW (ref 27.5–35.5)
AST SERPL-CCNC: 28 U/L — SIGNIFICANT CHANGE UP (ref 10–40)
BASOPHILS # BLD AUTO: 0.04 K/UL — SIGNIFICANT CHANGE UP (ref 0–0.2)
BASOPHILS NFR BLD AUTO: 0.4 % — SIGNIFICANT CHANGE UP (ref 0–2)
BILIRUB SERPL-MCNC: 0.7 MG/DL — SIGNIFICANT CHANGE UP (ref 0.2–1.2)
BUN SERPL-MCNC: 18 MG/DL — SIGNIFICANT CHANGE UP (ref 7–23)
CALCIUM SERPL-MCNC: 9.7 MG/DL — SIGNIFICANT CHANGE UP (ref 8.4–10.5)
CHLORIDE SERPL-SCNC: 102 MMOL/L — SIGNIFICANT CHANGE UP (ref 96–108)
CO2 SERPL-SCNC: 27 MMOL/L — SIGNIFICANT CHANGE UP (ref 22–31)
CREAT SERPL-MCNC: 0.74 MG/DL — SIGNIFICANT CHANGE UP (ref 0.5–1.3)
EOSINOPHIL # BLD AUTO: 0.05 K/UL — SIGNIFICANT CHANGE UP (ref 0–0.5)
EOSINOPHIL NFR BLD AUTO: 0.5 % — SIGNIFICANT CHANGE UP (ref 0–6)
GLUCOSE SERPL-MCNC: 105 MG/DL — HIGH (ref 70–99)
HCT VFR BLD CALC: 42.4 % — SIGNIFICANT CHANGE UP (ref 34.5–45)
HGB BLD-MCNC: 14.1 G/DL — SIGNIFICANT CHANGE UP (ref 11.5–15.5)
IMM GRANULOCYTES NFR BLD AUTO: 0.7 % — SIGNIFICANT CHANGE UP (ref 0–1.5)
INR BLD: 1.08 RATIO — SIGNIFICANT CHANGE UP (ref 0.88–1.16)
LYMPHOCYTES # BLD AUTO: 1.14 K/UL — SIGNIFICANT CHANGE UP (ref 1–3.3)
LYMPHOCYTES # BLD AUTO: 11 % — LOW (ref 13–44)
MCHC RBC-ENTMCNC: 29.3 PG — SIGNIFICANT CHANGE UP (ref 27–34)
MCHC RBC-ENTMCNC: 33.3 GM/DL — SIGNIFICANT CHANGE UP (ref 32–36)
MCV RBC AUTO: 88 FL — SIGNIFICANT CHANGE UP (ref 80–100)
MONOCYTES # BLD AUTO: 0.61 K/UL — SIGNIFICANT CHANGE UP (ref 0–0.9)
MONOCYTES NFR BLD AUTO: 5.9 % — SIGNIFICANT CHANGE UP (ref 2–14)
NEUTROPHILS # BLD AUTO: 8.48 K/UL — HIGH (ref 1.8–7.4)
NEUTROPHILS NFR BLD AUTO: 81.5 % — HIGH (ref 43–77)
NRBC # BLD: 0 /100 WBCS — SIGNIFICANT CHANGE UP (ref 0–0)
PLATELET # BLD AUTO: 192 K/UL — SIGNIFICANT CHANGE UP (ref 150–400)
POTASSIUM SERPL-MCNC: 4.1 MMOL/L — SIGNIFICANT CHANGE UP (ref 3.5–5.3)
POTASSIUM SERPL-SCNC: 4.1 MMOL/L — SIGNIFICANT CHANGE UP (ref 3.5–5.3)
PROT SERPL-MCNC: 7.6 G/DL — SIGNIFICANT CHANGE UP (ref 6–8.3)
PROTHROM AB SERPL-ACNC: 12.8 SEC — SIGNIFICANT CHANGE UP (ref 10.6–13.6)
RBC # BLD: 4.82 M/UL — SIGNIFICANT CHANGE UP (ref 3.8–5.2)
RBC # FLD: 13 % — SIGNIFICANT CHANGE UP (ref 10.3–14.5)
SODIUM SERPL-SCNC: 139 MMOL/L — SIGNIFICANT CHANGE UP (ref 135–145)
WBC # BLD: 10.39 K/UL — SIGNIFICANT CHANGE UP (ref 3.8–10.5)
WBC # FLD AUTO: 10.39 K/UL — SIGNIFICANT CHANGE UP (ref 3.8–10.5)

## 2020-09-19 PROCEDURE — 80053 COMPREHEN METABOLIC PANEL: CPT

## 2020-09-19 PROCEDURE — 93010 ELECTROCARDIOGRAM REPORT: CPT

## 2020-09-19 PROCEDURE — 99284 EMERGENCY DEPT VISIT MOD MDM: CPT | Mod: 25

## 2020-09-19 PROCEDURE — 85730 THROMBOPLASTIN TIME PARTIAL: CPT

## 2020-09-19 PROCEDURE — 99284 EMERGENCY DEPT VISIT MOD MDM: CPT

## 2020-09-19 PROCEDURE — 85610 PROTHROMBIN TIME: CPT

## 2020-09-19 PROCEDURE — 85025 COMPLETE CBC W/AUTO DIFF WBC: CPT

## 2020-09-19 PROCEDURE — 93005 ELECTROCARDIOGRAM TRACING: CPT

## 2020-09-19 RX ORDER — SODIUM CHLORIDE 9 MG/ML
1000 INJECTION INTRAMUSCULAR; INTRAVENOUS; SUBCUTANEOUS ONCE
Refills: 0 | Status: COMPLETED | OUTPATIENT
Start: 2020-09-19 | End: 2020-09-19

## 2020-09-19 RX ADMIN — SODIUM CHLORIDE 1000 MILLILITER(S): 9 INJECTION INTRAMUSCULAR; INTRAVENOUS; SUBCUTANEOUS at 19:50

## 2020-09-19 NOTE — ED PROVIDER NOTE - OBJECTIVE STATEMENT
54F PMH pulmonary HTN, AFib s/p ablation s/p Medtronic PPM p/w episode of dizziness and presyncope. Pt states she was sitting on her couch, bending over to paint her toenails, and laid down on her couch. The room then began spinning, she became and nausea and vomited. Pt felt warmth to the back of her neck and thought she was going to pass out, but no LOC. Sx began at 4:45pm and resolved by approximately 7pm upon ED evaluation. Pt was concerned that something was wrong with her PPM because she previously had episode of neck warmth in which her PPM was not functioning properly. Denies CP, palpitations, SOB. Has been ambulatory in the ED. No numbness, tingling, weakness, slurred speech, visual changes.

## 2020-09-19 NOTE — ED PROVIDER NOTE - PATIENT PORTAL LINK FT
You can access the FollowMyHealth Patient Portal offered by [facility] by registering at the following website: [email or web address]. By joining [employer]’s FollowMyHealth portal, you will also be able to view your health information using other applications (apps) compatible with our system.

## 2020-09-19 NOTE — ED PROVIDER NOTE - NSFOLLOWUPINSTRUCTIONS_ED_ALL_ED_FT
You were seen in the Emergency Department for dizziness, likely vertigo.  1) Advance activity as tolerated.    2) Continue all previously prescribed medications as directed.    3) Follow up with your primary care physician in 24-48 hours - take copies of your results.    4) Consider following up with a neurologist or ENT (ear, nose, throat) doctor.  5) Return to the Emergency Department for worsening or persistent symptoms, and/or ANY NEW OR CONCERNING SYMPTOMS, including visual changes, sudden weakness, numbness, tingling. If you have issues obtaining follow up, please call: [phone] to obtain a doctor or specialist who takes your insurance in your area.

## 2020-09-19 NOTE — ED PROVIDER NOTE - NSFOLLOWUPCLINICS_GEN_ALL_ED_FT
[facility] - ENT  Otolaryngology (ENT)  [address] [place] [address]  Phone: [phone]  Fax:   Follow Up Time: 1-3 Days    [facility]  Neurology  [address] [place] [address] [address] [place] [address]  Phone: [phone]  Fax:   Follow Up Time: 1-3 Days

## 2020-09-19 NOTE — ED PROVIDER NOTE - NS ED ROS FT
General: Denies fevers or chills  Eyes: Denies vision changes  ENMT: Denies trouble swallowing or speaking, or sore throat  CV: Denies chest pain or palpitations  Resp: Denies cough or SOB  GI: +Nausea, vomiting. Denies abdominal pain, diarrhea, or constipation   : Denies painful urination, increased urinary frequency, or blood in urine  Skin: Denies new rashes  Neuro: +Dizziness, presyncope. Denies headache, numbness, or weakness  MSK: Denies recent trauma

## 2020-09-19 NOTE — ED ADULT NURSE NOTE - OBJECTIVE STATEMENT
patient is a 54 year old female with a PMH of AFIB (pacemaker placed 2014) who presents to the ED from home complaining of n/v and dizziness x 1 day. as per patient she was watching tv and began to feel extremely dizzy while laying down. patient states she got herself to the bathroom where she had one episode of vomiting. patient is aaox3, lungs CTA bilaterally, abd soft, nondistended, nontender, cap refill <3, radial pulses +2 bilaterally. patient denies chest pain, shortness of breath, ha, dizziness, weakness, numbness or tingling, fever, chills, changes in bowel or bladder, hematuria, bloody stool. patient  at bedside. a paced on monitor. patient comfort and safety provided. will continue to monitor. EKG done. IV placed.

## 2020-09-19 NOTE — ED PROVIDER NOTE - PROGRESS NOTE DETAILS
Gilmar, PGY2 - Medtronic PPM interrogated. EP fellow contacted. Gilmar, PGY2 - PPM interrogation discussed with cards fellow. Normal pacemaker function, no events correlating to episode of dizziness. Pt was re-evaluated at bedside, VSS, feeling better overall. Ambulating in the ED. Expresses that she would like to be discharged at this time. Results were discussed with patient as well as return precautions and follow up plan with PCP and/or specialist. Time was taken to answer any questions that the patient had before providing them with discharge paperwork.

## 2020-09-19 NOTE — ED PROVIDER NOTE - CLINICAL SUMMARY MEDICAL DECISION MAKING FREE TEXT BOX
Gilmar, PGY2 - 54F with Medtronic PPM p/w episode of dizziness and presyncope. EKG atrial-paced rhythm. Likely BPPV as was provoked by change in position and is well-appearing, low suspicion for posterior CVA given normal neuro exam. Plan: basics labs, ivf, PPM interrogation

## 2020-09-19 NOTE — ED PROVIDER NOTE - ATTENDING CONTRIBUTION TO CARE
54y F hx pulm HTN, VSD repair as a child, atrial arrhythmia sp ablation and medtronic PPM here with c/o dizziness, nausea, vomiting, unsteady gait, sx started suddenly while laying on couch after bending down to paint toenails. Sx almost completely resolved upon arrival to ED. No HA. No syncope. No slurred speech, weakness, numbness. No CP, SOB, palp. Pt is neuro intact on eval, fatiguable horizontal nystagmus, no ataxia, finger to nose intact. Suspect BPPV, possibly triggered by bending forward to paint toenails. Less likely central vertigo. Given pt with PPM and PPM dependent will interrogate to r/o arrhythmia, ectopy, though that is less likely given hx. Labs, EKG, IVF. Re-eval.

## 2020-09-19 NOTE — ED ADULT NURSE NOTE - NSIMPLEMENTINTERV_GEN_ALL_ED
Implemented All Universal Safety Interventions:  [place] to call system. Call bell, personal items and telephone within reach. Instruct patient to call for assistance. Room bathroom lighting operational. Non-slip footwear when patient is off stretcher. Physically safe environment: no spills, clutter or unnecessary equipment. Stretcher in lowest position, wheels locked, appropriate side rails in place.

## 2020-09-19 NOTE — ED PROVIDER NOTE - PHYSICAL EXAMINATION
I have reviewed the triage vital signs.  Const: AAOx4, in NAD  Eyes: no conjunctival injection  HENT: NCAT, Neck supple, oral mucosa moist  CV: RRR, +S1, S2  Resp: CTAB, no respiratory distress  GI: Abdomen soft, NTND, no guarding, no CVA tenderness  Extremities: No peripheral edema,  2+ radial and DP pulses  Skin: Warm, well perfused, no rash  MSK: No gross deformities appreciated  Neuro: CN2-12 grossly intact, A&Ox4, MS +5/5 in UE and LE BL, finger to nose smooth and rapid, gross sensation intact in UE and LE BL, ambulatory  Psych: Appropriate mood and affect

## 2020-09-19 NOTE — CHART NOTE - NSCHARTNOTEFT_GEN_A_CORE
ELECTROPHYSIOLOGY  Device Interrogation Performed                                  Date/Time: 9/19/20 8:26PM  :    [employer]                     Model:     Adapta L [id number]        Mode:  AAIR ,_>DDDR               Rate:     60PPM    Atrial Lead:  P wave amplitude:  1.750V          Impedence:  377 Ohms      Threshold:  0.875V@  0.40 ms      Ventricular Lead(s):  RV Lead: R wave amplitude:     2.00V          Impedence:    504 Ohms      Threshold:    0.875V@ 0.40  ms     Battery Status:   Good        Events/Observation:    Impression/Plan:  - Dual Chamber Pacemaker  - 9 years battery longevity  - AP 99% &  1.3%  - All lead measurements are within normal limits  -13x AHR events - last on 4/4/20  - 16x VHR events - last on 7/25/20  - Device last checked on 4/16/19  - Normal Pacemaker Function    Boo Blue MD  Cardiology Fellow  [phone]    Please check [email or web address]: [email or web address] for cardiology service schedule and contact information.

## 2020-09-19 NOTE — ED ADULT TRIAGE NOTE - CHIEF COMPLAINT QUOTE
room spinning, vomiting, near syncope earlier today, (currently on nutrisystem for wt loss), +has pacemaker

## 2020-10-16 ENCOUNTER — APPOINTMENT (OUTPATIENT)
Dept: ELECTROPHYSIOLOGY | Facility: CLINIC | Age: 55
End: 2020-10-16
Payer: COMMERCIAL

## 2020-10-16 PROCEDURE — 93280 PM DEVICE PROGR EVAL DUAL: CPT

## 2020-10-16 RX ORDER — NICOTINE POLACRILEX 2 MG
1 GUM BUCCAL DAILY
Refills: 0 | Status: DISCONTINUED | COMMUNITY
Start: 2018-03-20 | End: 2020-10-16

## 2020-10-16 RX ORDER — TORSEMIDE 20 MG/1
20 TABLET ORAL
Qty: 90 | Refills: 0 | Status: DISCONTINUED | COMMUNITY
Start: 2019-09-24 | End: 2020-10-16

## 2020-12-07 DIAGNOSIS — Z01.818 ENCOUNTER FOR OTHER PREPROCEDURAL EXAMINATION: ICD-10-CM

## 2020-12-08 ENCOUNTER — APPOINTMENT (OUTPATIENT)
Dept: DISASTER EMERGENCY | Facility: CLINIC | Age: 55
End: 2020-12-08

## 2020-12-11 ENCOUNTER — APPOINTMENT (OUTPATIENT)
Dept: CV DIAGNOSITCS | Facility: HOSPITAL | Age: 55
End: 2020-12-11
Payer: COMMERCIAL

## 2020-12-11 ENCOUNTER — OUTPATIENT (OUTPATIENT)
Dept: OUTPATIENT SERVICES | Facility: HOSPITAL | Age: 55
LOS: 1 days | End: 2020-12-11

## 2020-12-11 DIAGNOSIS — R42 DIZZINESS AND GIDDINESS: ICD-10-CM

## 2020-12-11 DIAGNOSIS — Z95.0 PRESENCE OF CARDIAC PACEMAKER: Chronic | ICD-10-CM

## 2020-12-11 DIAGNOSIS — Z98.890 OTHER SPECIFIED POSTPROCEDURAL STATES: Chronic | ICD-10-CM

## 2020-12-11 LAB — HCG UR QL: NEGATIVE — SIGNIFICANT CHANGE UP

## 2020-12-11 PROCEDURE — 93312 ECHO TRANSESOPHAGEAL: CPT | Mod: 26

## 2020-12-11 PROCEDURE — 93306 TTE W/DOPPLER COMPLETE: CPT | Mod: 26

## 2020-12-11 RX ORDER — SODIUM CHLORIDE 9 MG/ML
3 INJECTION INTRAMUSCULAR; INTRAVENOUS; SUBCUTANEOUS ONCE
Refills: 0 | Status: DISCONTINUED | OUTPATIENT
Start: 2020-12-11 | End: 2020-12-25

## 2020-12-14 DIAGNOSIS — R42 DIZZINESS AND GIDDINESS: ICD-10-CM

## 2021-02-09 ENCOUNTER — APPOINTMENT (OUTPATIENT)
Dept: ELECTROPHYSIOLOGY | Facility: CLINIC | Age: 56
End: 2021-02-09
Payer: COMMERCIAL

## 2021-02-09 PROCEDURE — 93294 REM INTERROG EVL PM/LDLS PM: CPT

## 2021-02-09 PROCEDURE — 93296 REM INTERROG EVL PM/IDS: CPT

## 2021-02-25 ENCOUNTER — FORM ENCOUNTER (OUTPATIENT)
Age: 56
End: 2021-02-25

## 2021-02-28 ENCOUNTER — NON-APPOINTMENT (OUTPATIENT)
Age: 56
End: 2021-02-28

## 2021-03-09 ENCOUNTER — RESULT REVIEW (OUTPATIENT)
Age: 56
End: 2021-03-09

## 2021-07-19 ENCOUNTER — NON-APPOINTMENT (OUTPATIENT)
Age: 56
End: 2021-07-19

## 2021-07-19 ENCOUNTER — APPOINTMENT (OUTPATIENT)
Dept: ELECTROPHYSIOLOGY | Facility: CLINIC | Age: 56
End: 2021-07-19
Payer: COMMERCIAL

## 2021-07-19 PROCEDURE — 93294 REM INTERROG EVL PM/LDLS PM: CPT

## 2021-07-19 PROCEDURE — 93296 REM INTERROG EVL PM/IDS: CPT

## 2021-10-14 ENCOUNTER — APPOINTMENT (OUTPATIENT)
Dept: ELECTROPHYSIOLOGY | Facility: CLINIC | Age: 56
End: 2021-10-14

## 2021-10-22 ENCOUNTER — APPOINTMENT (OUTPATIENT)
Dept: ELECTROPHYSIOLOGY | Facility: CLINIC | Age: 56
End: 2021-10-22
Payer: COMMERCIAL

## 2021-10-22 PROCEDURE — 93280 PM DEVICE PROGR EVAL DUAL: CPT

## 2022-01-20 ENCOUNTER — APPOINTMENT (OUTPATIENT)
Dept: ELECTROPHYSIOLOGY | Facility: CLINIC | Age: 57
End: 2022-01-20
Payer: COMMERCIAL

## 2022-01-20 ENCOUNTER — NON-APPOINTMENT (OUTPATIENT)
Age: 57
End: 2022-01-20

## 2022-01-20 PROCEDURE — 93296 REM INTERROG EVL PM/IDS: CPT

## 2022-01-20 PROCEDURE — 93294 REM INTERROG EVL PM/LDLS PM: CPT

## 2022-03-01 ENCOUNTER — RESULT REVIEW (OUTPATIENT)
Age: 57
End: 2022-03-01

## 2022-04-20 ENCOUNTER — NON-APPOINTMENT (OUTPATIENT)
Age: 57
End: 2022-04-20

## 2022-04-20 ENCOUNTER — APPOINTMENT (OUTPATIENT)
Dept: ELECTROPHYSIOLOGY | Facility: CLINIC | Age: 57
End: 2022-04-20
Payer: COMMERCIAL

## 2022-04-21 ENCOUNTER — APPOINTMENT (OUTPATIENT)
Dept: ELECTROPHYSIOLOGY | Facility: CLINIC | Age: 57
End: 2022-04-21
Payer: COMMERCIAL

## 2022-04-21 PROCEDURE — 93294 REM INTERROG EVL PM/LDLS PM: CPT

## 2022-04-21 PROCEDURE — 93296 REM INTERROG EVL PM/IDS: CPT

## 2022-04-29 NOTE — ED ADULT NURSE NOTE - NS ED NOTE  TALK SOMEONE YN
Patients mom in office and request psych eval records from [address]  On 12-18-17  Records given
No

## 2022-07-20 ENCOUNTER — APPOINTMENT (OUTPATIENT)
Dept: ELECTROPHYSIOLOGY | Facility: CLINIC | Age: 57
End: 2022-07-20

## 2022-07-21 ENCOUNTER — FORM ENCOUNTER (OUTPATIENT)
Age: 57
End: 2022-07-21

## 2022-07-22 ENCOUNTER — APPOINTMENT (OUTPATIENT)
Dept: ELECTROPHYSIOLOGY | Facility: CLINIC | Age: 57
End: 2022-07-22

## 2022-07-22 ENCOUNTER — NON-APPOINTMENT (OUTPATIENT)
Age: 57
End: 2022-07-22

## 2022-07-22 PROCEDURE — 93294 REM INTERROG EVL PM/LDLS PM: CPT

## 2022-07-22 PROCEDURE — 93296 REM INTERROG EVL PM/IDS: CPT

## 2022-08-08 NOTE — ED PROVIDER NOTE - BIRTH SEX
Patient discharged from  services to care of self in her home in stable condition effective 8.8.22 d/t goals met, teaching complete, and patient independent with care.  No further SN needs at this time. Female

## 2022-10-07 ENCOUNTER — NON-APPOINTMENT (OUTPATIENT)
Age: 57
End: 2022-10-07

## 2022-10-24 ENCOUNTER — APPOINTMENT (OUTPATIENT)
Dept: ELECTROPHYSIOLOGY | Facility: CLINIC | Age: 57
End: 2022-10-24

## 2022-10-25 ENCOUNTER — APPOINTMENT (OUTPATIENT)
Dept: ELECTROPHYSIOLOGY | Facility: CLINIC | Age: 57
End: 2022-10-25

## 2022-10-25 PROCEDURE — 93280 PM DEVICE PROGR EVAL DUAL: CPT

## 2022-10-25 RX ORDER — CETIRIZINE HYDROCHLORIDE 10 MG/1
TABLET, FILM COATED ORAL
Refills: 0 | Status: ACTIVE | COMMUNITY

## 2022-12-18 ENCOUNTER — FORM ENCOUNTER (OUTPATIENT)
Age: 57
End: 2022-12-18

## 2023-01-24 ENCOUNTER — APPOINTMENT (OUTPATIENT)
Dept: ELECTROPHYSIOLOGY | Facility: CLINIC | Age: 58
End: 2023-01-24

## 2023-01-24 ENCOUNTER — FORM ENCOUNTER (OUTPATIENT)
Age: 58
End: 2023-01-24

## 2023-01-25 ENCOUNTER — NON-APPOINTMENT (OUTPATIENT)
Age: 58
End: 2023-01-25

## 2023-01-26 ENCOUNTER — APPOINTMENT (OUTPATIENT)
Dept: ELECTROPHYSIOLOGY | Facility: CLINIC | Age: 58
End: 2023-01-26
Payer: COMMERCIAL

## 2023-01-26 PROCEDURE — 93296 REM INTERROG EVL PM/IDS: CPT

## 2023-01-26 PROCEDURE — 93294 REM INTERROG EVL PM/LDLS PM: CPT

## 2023-02-15 NOTE — DISCHARGE NOTE ADULT - NSTOBACCOHOTLINE_GEN_A_NCS
[facility] Smokers Quitline ([id number]) Xolair Counseling:  Patient informed of potential adverse effects including but not limited to fever, muscle aches, rash and allergic reactions.  The patient verbalized understanding of the proper use and possible adverse effects of Xolair.  All of the patient's questions and concerns were addressed.

## 2023-04-07 ENCOUNTER — APPOINTMENT (OUTPATIENT)
Dept: ELECTROPHYSIOLOGY | Facility: CLINIC | Age: 58
End: 2023-04-07
Payer: COMMERCIAL

## 2023-04-07 DIAGNOSIS — R00.1 BRADYCARDIA, UNSPECIFIED: ICD-10-CM

## 2023-04-07 PROCEDURE — 93280 PM DEVICE PROGR EVAL DUAL: CPT

## 2023-04-11 PROBLEM — R00.1 BRADYCARDIA: Status: ACTIVE | Noted: 2018-10-09

## 2023-04-21 ENCOUNTER — NON-APPOINTMENT (OUTPATIENT)
Age: 58
End: 2023-04-21

## 2023-05-04 NOTE — ED PROVIDER NOTE - CROS ED RESP ALL NEG
Subjective   Patient ID: Amaury Longoria is a 9 y.o. male who presents for Well Child (9 yr old here with grandma for [facility]).    Concerns: none expressed    Sleep: 11 hours nightly   Diet: fruits veggies some cheese, milk on cereal water  Greenback:no issues  Dental:  School: in 1 st grade grades good, good friends  Activities: basketball baseball  Behavior: helping at home  when told,  good behavior        Review of Systems  Review of symptoms all normal except for those mentioned in HPI.      Objective   Physical Exam  General: Well-developed, well-nourished, alert and oriented, no acute distress  Eyes: Normal sclera, YUSUF, EOMI. Red reflex intact, light reflex symmetric.   ENT: Moist mucous membranes, normal throat, no nasal discharge. TMs are normal.  Cardiac:  Normal S1/S2, regular rhythm. Capillary refill less than 2 seconds. No clinically significant murmurs.    Pulmonary: Clear to auscultation bilaterally, no work of breathing.  GI: Soft nontender nondistended abdomen, no HSM, no masses.    Skin: No specific or unusual rashes  Neuro: Symmetric face, moving all extremities, normal tone.  Lymph and Neck: No lymphadenopathy, no visible thyroid swelling.  Orthopedic:  No hip clicks in infants   :  Testes down.  Normal penis.   General: Well-developed, well-nourished, alert and oriented, no acute distress  Eyes: Normal sclera, YUSUF, EOMI. Red reflex intact, light reflex symmetric.   ENT: Moist mucous membranes, normal throat, no nasal discharge. TMs are normal.  Cardiac:  Normal S1/S2, regular rhythm. Capillary refill less than 2 seconds. No clinically significant murmurs.    Pulmonary: Clear to auscultation bilaterally, no work of breathing.  GI: Soft nontender nondistended abdomen, no HSM, no masses.    Skin: No specific or unusual rashes  Neuro: Symmetric face, moving all extremities, normal tone.  Lymph and Neck: No lymphadenopathy, no visible thyroid swelling.  Orthopedic:  No hip clicks in infants   :   Testes down.  Normal penis.       Assessment/Plan   Diagnoses and all orders for this visit:  Encounter for routine child health examination without abnormal findings  Pediatric body mass index (BMI) of 5th percentile to less than 85th percentile for age      Amaury is growing and developing well. Use helmets whenever riding bikes or scooters. In the car, the safest guidelines recommend using a booster seat until your child is 57 inches tall.  At a minimum, use a booster seat until 8 years and 80 pounds in weight to be in compliance with state law.  We discussed physical activity and nutritional requirements for your child today.  Amaury should return annually for a checkup.            - - -

## 2023-05-12 ENCOUNTER — LABORATORY RESULT (OUTPATIENT)
Age: 58
End: 2023-05-12

## 2023-05-12 ENCOUNTER — APPOINTMENT (OUTPATIENT)
Dept: INTERNAL MEDICINE | Facility: CLINIC | Age: 58
End: 2023-05-12
Payer: COMMERCIAL

## 2023-05-12 VITALS
SYSTOLIC BLOOD PRESSURE: 109 MMHG | OXYGEN SATURATION: 96 % | HEART RATE: 74 BPM | BODY MASS INDEX: 27.78 KG/M2 | WEIGHT: 177 LBS | RESPIRATION RATE: 12 BRPM | DIASTOLIC BLOOD PRESSURE: 75 MMHG | HEIGHT: 67 IN

## 2023-05-12 DIAGNOSIS — Z00.00 ENCOUNTER FOR GENERAL ADULT MEDICAL EXAMINATION W/OUT ABNORMAL FINDINGS: ICD-10-CM

## 2023-05-12 DIAGNOSIS — Z95.0 PRESENCE OF CARDIAC PACEMAKER: ICD-10-CM

## 2023-05-12 DIAGNOSIS — D64.9 ANEMIA, UNSPECIFIED: ICD-10-CM

## 2023-05-12 PROCEDURE — 99386 PREV VISIT NEW AGE 40-64: CPT

## 2023-05-12 RX ORDER — TORSEMIDE 20 MG/1
20 TABLET ORAL
Refills: 0 | Status: DISCONTINUED | COMMUNITY
End: 2023-05-12

## 2023-05-15 NOTE — HISTORY OF PRESENT ILLNESS
[de-identified] : Patient is 57 year old female with history of  open heart surgery in infancy (VSD repair), paroxysmal AT s/p ablation x3 a / resultant PPM placement,pt currently off AC x 2 years / Pulmonary hypertension who presents today for annual physical \par \par She feels well \par Denies any CP, SOB, HA, Dizziness, N/V or abdominal pain \par \par Patient reports following with cardiology at [facility] \par Last PPM change 2014, following with EP

## 2023-05-15 NOTE — PLAN
[FreeTextEntry1] : Physical \par see plan \par \par Referral for GYN and Mammo \par \par History of PPM \par Following with Cardiology Dr. Moon ( [facility]) and Dr. Singh ( ) \par cont Metoprolol 100 mg daily \par \par Weight management, Healthy food choices, and increased physical active discussed \par \par Labs ordered pt to follow-up in 1 week for results

## 2023-05-15 NOTE — PHYSICAL EXAM
[No Acute Distress] : no acute distress [Normal Sclera/Conjunctiva] : normal sclera/conjunctiva [EOMI] : extraocular movements intact [Normal Outer Ear/Nose] : the outer ears and nose were normal in appearance [Normal Oropharynx] : the oropharynx was normal [No JVD] : no jugular venous distention [No Lymphadenopathy] : no lymphadenopathy [Supple] : supple [Thyroid Normal, No Nodules] : the thyroid was normal and there were no nodules present [No Respiratory Distress] : no respiratory distress  [No Accessory Muscle Use] : no accessory muscle use [Clear to Auscultation] : lungs were clear to auscultation bilaterally [Normal Rate] : normal rate  [Regular Rhythm] : with a regular rhythm [Normal S1, S2] : normal S1 and S2 [No Carotid Bruits] : no carotid bruits [No Varicosities] : no varicosities [Pedal Pulses Present] : the pedal pulses are present [No Edema] : there was no peripheral edema [No Extremity Clubbing/Cyanosis] : no extremity clubbing/cyanosis [Soft] : abdomen soft [Non Tender] : non-tender [Non-distended] : non-distended [No Masses] : no abdominal mass palpated [No HSM] : no HSM [Normal Bowel Sounds] : normal bowel sounds [Normal Posterior Cervical Nodes] : no posterior cervical lymphadenopathy [Normal Anterior Cervical Nodes] : no anterior cervical lymphadenopathy [No CVA Tenderness] : no CVA  tenderness [No Spinal Tenderness] : no spinal tenderness [No Joint Swelling] : no joint swelling [Grossly Normal Strength/Tone] : grossly normal strength/tone [No Rash] : no rash [Coordination Grossly Intact] : coordination grossly intact [No Focal Deficits] : no focal deficits [Normal Gait] : normal gait [Normal Affect] : the affect was normal [Normal Insight/Judgement] : insight and judgment were intact [de-identified] : +LCW PPM

## 2023-05-15 NOTE — HEALTH RISK ASSESSMENT
[Patient reported mammogram was normal] : Patient reported mammogram was normal [Patient reported PAP Smear was normal] : Patient reported PAP Smear was normal [Patient reported colonoscopy was normal] : Patient reported colonoscopy was normal [MammogramDate] : 3/2022 [PapSmearDate] : 3/2022 [ColonoscopyDate] : 10 years ago

## 2023-05-18 LAB
25(OH)D3 SERPL-MCNC: 38.5 NG/ML
ALBUMIN SERPL ELPH-MCNC: 4.7 G/DL
ALP BLD-CCNC: 85 U/L
ALT SERPL-CCNC: 34 U/L
ANION GAP SERPL CALC-SCNC: 16 MMOL/L
APPEARANCE: ABNORMAL
AST SERPL-CCNC: 37 U/L
BACTERIA: ABNORMAL /HPF
BASOPHILS # BLD AUTO: 0.02 K/UL
BASOPHILS NFR BLD AUTO: 0.5 %
BILIRUB SERPL-MCNC: 0.8 MG/DL
BILIRUBIN URINE: NEGATIVE
BLOOD URINE: NEGATIVE
BUN SERPL-MCNC: 17 MG/DL
CALCIUM SERPL-MCNC: 10.5 MG/DL
CAST: 0 /LPF
CHLORIDE SERPL-SCNC: 100 MMOL/L
CHOLEST SERPL-MCNC: 138 MG/DL
CO2 SERPL-SCNC: 23 MMOL/L
COLOR: NORMAL
CREAT SERPL-MCNC: 0.74 MG/DL
EGFR: 95 ML/MIN/1.73M2
EOSINOPHIL # BLD AUTO: 0.07 K/UL
EOSINOPHIL NFR BLD AUTO: 1.7 %
EPITHELIAL CELLS: 18 /HPF
ESTIMATED AVERAGE GLUCOSE: 120 MG/DL
GLUCOSE QUALITATIVE U: NEGATIVE MG/DL
GLUCOSE SERPL-MCNC: 90 MG/DL
HBA1C MFR BLD HPLC: 5.8 %
HCT VFR BLD CALC: 44.9 %
HDLC SERPL-MCNC: 51 MG/DL
HGB BLD-MCNC: 14.2 G/DL
IMM GRANULOCYTES NFR BLD AUTO: 0.2 %
KETONES URINE: ABNORMAL MG/DL
LDLC SERPL CALC-MCNC: 73 MG/DL
LEUKOCYTE ESTERASE URINE: NEGATIVE
LYMPHOCYTES # BLD AUTO: 1.34 K/UL
LYMPHOCYTES NFR BLD AUTO: 31.6 %
MAN DIFF?: NORMAL
MCHC RBC-ENTMCNC: 29.7 PG
MCHC RBC-ENTMCNC: 31.6 GM/DL
MCV RBC AUTO: 93.9 FL
MICROSCOPIC-UA: NORMAL
MONOCYTES # BLD AUTO: 0.48 K/UL
MONOCYTES NFR BLD AUTO: 11.3 %
NEUTROPHILS # BLD AUTO: 2.32 K/UL
NEUTROPHILS NFR BLD AUTO: 54.7 %
NITRITE URINE: NEGATIVE
NONHDLC SERPL-MCNC: 87 MG/DL
PH URINE: 6
PLATELET # BLD AUTO: 167 K/UL
POTASSIUM SERPL-SCNC: 4.1 MMOL/L
PROT SERPL-MCNC: 7.8 G/DL
PROTEIN URINE: NORMAL MG/DL
RBC # BLD: 4.78 M/UL
RBC # FLD: 13.2 %
RED BLOOD CELLS URINE: 9 /HPF
SODIUM SERPL-SCNC: 140 MMOL/L
SPECIFIC GRAVITY URINE: 1.03
TRIGL SERPL-MCNC: 72 MG/DL
TSH SERPL-ACNC: 4.36 UIU/ML
UROBILINOGEN URINE: 0.2 MG/DL
VIT B12 SERPL-MCNC: 626 PG/ML
WBC # FLD AUTO: 4.24 K/UL
WHITE BLOOD CELLS URINE: 2 /HPF

## 2023-05-19 ENCOUNTER — APPOINTMENT (OUTPATIENT)
Dept: GASTROENTEROLOGY | Facility: CLINIC | Age: 58
End: 2023-05-19

## 2023-05-26 ENCOUNTER — APPOINTMENT (OUTPATIENT)
Dept: INTERNAL MEDICINE | Facility: CLINIC | Age: 58
End: 2023-05-26

## 2023-06-05 ENCOUNTER — NON-APPOINTMENT (OUTPATIENT)
Age: 58
End: 2023-06-05

## 2023-06-06 ENCOUNTER — APPOINTMENT (OUTPATIENT)
Dept: INTERNAL MEDICINE | Facility: CLINIC | Age: 58
End: 2023-06-06
Payer: COMMERCIAL

## 2023-06-06 VITALS
OXYGEN SATURATION: 95 % | TEMPERATURE: 97.7 F | WEIGHT: 172 LBS | RESPIRATION RATE: 12 BRPM | DIASTOLIC BLOOD PRESSURE: 78 MMHG | HEIGHT: 67 IN | HEART RATE: 79 BPM | BODY MASS INDEX: 27 KG/M2 | SYSTOLIC BLOOD PRESSURE: 124 MMHG

## 2023-06-06 PROCEDURE — 99214 OFFICE O/P EST MOD 30 MIN: CPT

## 2023-06-07 NOTE — PHYSICAL EXAM
[No Acute Distress] : no acute distress [Normal Sclera/Conjunctiva] : normal sclera/conjunctiva [EOMI] : extraocular movements intact [Normal Outer Ear/Nose] : the outer ears and nose were normal in appearance [Normal Oropharynx] : the oropharynx was normal [No JVD] : no jugular venous distention [No Lymphadenopathy] : no lymphadenopathy [Supple] : supple [Thyroid Normal, No Nodules] : the thyroid was normal and there were no nodules present [No Respiratory Distress] : no respiratory distress  [No Accessory Muscle Use] : no accessory muscle use [Clear to Auscultation] : lungs were clear to auscultation bilaterally [Normal Rate] : normal rate  [Regular Rhythm] : with a regular rhythm [Normal S1, S2] : normal S1 and S2 [No Varicosities] : no varicosities [Pedal Pulses Present] : the pedal pulses are present [No Edema] : there was no peripheral edema [No Extremity Clubbing/Cyanosis] : no extremity clubbing/cyanosis [Soft] : abdomen soft [Non Tender] : non-tender [Non-distended] : non-distended [No Masses] : no abdominal mass palpated [Normal Bowel Sounds] : normal bowel sounds [Normal Posterior Cervical Nodes] : no posterior cervical lymphadenopathy [Normal Anterior Cervical Nodes] : no anterior cervical lymphadenopathy [No CVA Tenderness] : no CVA  tenderness [No Spinal Tenderness] : no spinal tenderness [No Joint Swelling] : no joint swelling [Grossly Normal Strength/Tone] : grossly normal strength/tone [No Rash] : no rash [No Focal Deficits] : no focal deficits [Normal Gait] : normal gait [Normal Affect] : the affect was normal [Normal Insight/Judgement] : insight and judgment were intact [Normal TMs] : both tympanic membranes were normal [de-identified] : +LCW PPM / + HATTIE 2/6 RSB

## 2023-06-07 NOTE — HISTORY OF PRESENT ILLNESS
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

## 2023-06-07 NOTE — PLAN
[FreeTextEntry1] : Follow-up \par \par Labs reviewed with understanding \par \par Elevated TSH - Normal TSH \par Will  monitor \par \par Elevated A1C \par Continue diet and exercise \par will repeat   \par \par Pulmonary Htn \par following with cardiology

## 2023-07-04 RX ORDER — METOPROLOL SUCCINATE 100 MG/1
100 TABLET, EXTENDED RELEASE ORAL DAILY
Qty: 30 | Refills: 2 | Status: ACTIVE | COMMUNITY
Start: 2019-04-16 | End: 1900-01-01

## 2023-07-07 ENCOUNTER — APPOINTMENT (OUTPATIENT)
Dept: ELECTROPHYSIOLOGY | Facility: CLINIC | Age: 58
End: 2023-07-07
Payer: COMMERCIAL

## 2023-07-07 ENCOUNTER — NON-APPOINTMENT (OUTPATIENT)
Age: 58
End: 2023-07-07

## 2023-07-07 PROCEDURE — 93294 REM INTERROG EVL PM/LDLS PM: CPT

## 2023-07-07 PROCEDURE — 93296 REM INTERROG EVL PM/IDS: CPT

## 2023-07-09 ENCOUNTER — FORM ENCOUNTER (OUTPATIENT)
Age: 58
End: 2023-07-09

## 2023-07-14 ENCOUNTER — APPOINTMENT (OUTPATIENT)
Dept: INTERNAL MEDICINE | Facility: CLINIC | Age: 58
End: 2023-07-14
Payer: COMMERCIAL

## 2023-07-14 VITALS
HEART RATE: 84 BPM | WEIGHT: 170 LBS | BODY MASS INDEX: 26.68 KG/M2 | DIASTOLIC BLOOD PRESSURE: 81 MMHG | TEMPERATURE: 97.5 F | SYSTOLIC BLOOD PRESSURE: 114 MMHG | OXYGEN SATURATION: 94 % | HEIGHT: 67 IN | RESPIRATION RATE: 12 BRPM

## 2023-07-14 DIAGNOSIS — R79.89 OTHER SPECIFIED ABNORMAL FINDINGS OF BLOOD CHEMISTRY: ICD-10-CM

## 2023-07-14 DIAGNOSIS — I27.20 PULMONARY HYPERTENSION, UNSPECIFIED: ICD-10-CM

## 2023-07-14 DIAGNOSIS — I47.1 SUPRAVENTRICULAR TACHYCARDIA: ICD-10-CM

## 2023-07-14 DIAGNOSIS — I48.0 PAROXYSMAL ATRIAL FIBRILLATION: ICD-10-CM

## 2023-07-14 DIAGNOSIS — Z91.89 OTHER SPECIFIED PERSONAL RISK FACTORS, NOT ELSEWHERE CLASSIFIED: ICD-10-CM

## 2023-07-14 PROCEDURE — 99214 OFFICE O/P EST MOD 30 MIN: CPT

## 2023-07-16 PROBLEM — I47.1 PAROXYSMAL SVT (SUPRAVENTRICULAR TACHYCARDIA): Status: ACTIVE | Noted: 2019-04-16

## 2023-07-16 PROBLEM — I27.20 PULMONARY HYPERTENSION: Status: ACTIVE | Noted: 2018-03-20

## 2023-07-16 PROBLEM — I48.0 PAROXYSMAL ATRIAL FIBRILLATION: Status: ACTIVE | Noted: 2018-03-20

## 2023-07-16 NOTE — HISTORY OF PRESENT ILLNESS
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

## 2023-07-16 NOTE — PLAN
[FreeTextEntry1] : Follow-up \par \par \par Elevated TSH - Normal TSH \par Will check TSH today \par \par High risk DIabetes\par Low carb, low sugar diet/ increased physical activity\par Will monitor A1C \par \par h/o SVT/ Pulmonary Htn \par following with cardio\par \par

## 2023-07-17 LAB — TSH SERPL-ACNC: 2.59 UIU/ML

## 2023-08-04 ENCOUNTER — APPOINTMENT (OUTPATIENT)
Dept: GASTROENTEROLOGY | Facility: CLINIC | Age: 58
End: 2023-08-04
Payer: COMMERCIAL

## 2023-08-04 DIAGNOSIS — Z86.010 PERSONAL HISTORY OF COLONIC POLYPS: ICD-10-CM

## 2023-08-04 DIAGNOSIS — Z12.11 ENCOUNTER FOR SCREENING FOR MALIGNANT NEOPLASM OF COLON: ICD-10-CM

## 2023-08-04 DIAGNOSIS — Z78.9 OTHER SPECIFIED HEALTH STATUS: ICD-10-CM

## 2023-08-04 PROCEDURE — 99204 OFFICE O/P NEW MOD 45 MIN: CPT

## 2023-08-05 VITALS
HEIGHT: 67 IN | WEIGHT: 170 LBS | BODY MASS INDEX: 26.68 KG/M2 | SYSTOLIC BLOOD PRESSURE: 110 MMHG | DIASTOLIC BLOOD PRESSURE: 76 MMHG

## 2023-08-05 PROBLEM — Z86.010 HISTORY OF COLON POLYPS: Status: ACTIVE | Noted: 2023-08-05

## 2023-08-05 PROBLEM — Z12.11 COLON CANCER SCREENING: Status: ACTIVE | Noted: 2023-08-05

## 2023-08-05 PROBLEM — Z78.9 SOCIAL ALCOHOL USE: Status: ACTIVE | Noted: 2023-08-05

## 2023-08-05 RX ORDER — SODIUM SULFATE, MAGNESIUM SULFATE, AND POTASSIUM CHLORIDE 17.75; 2.7; 2.25 G/1; G/1; G/1
1479-225-188 TABLET ORAL TWICE DAILY
Qty: 24 | Refills: 0 | Status: ACTIVE | COMMUNITY
Start: 2023-08-05 | End: 1900-01-01

## 2023-08-05 NOTE — CONSULT LETTER
[Dear  ___] : Dear  [unfilled], [Consult Letter:] : I had the pleasure of evaluating your patient, [unfilled]. [( Thank you for referring [unfilled] for consultation for _____ )] : Thank you for referring [unfilled] for consultation for [unfilled] [Please see my note below.] : Please see my note below. [Consult Closing:] : Thank you very much for allowing me to participate in the care of this patient.  If you have any questions, please do not hesitate to contact me. [Sincerely,] : Sincerely, [FreeTextEntry3] : Juan David Vaughan MD  Gastroenterology [facility]

## 2023-08-05 NOTE — HISTORY OF PRESENT ILLNESS
[FreeTextEntry1] : She is an asymptomatic 57 year old female referred for a screening colonoscopy. Her last colonoscopy  was 10 year ago in which polyps were removed. She denies a family history of colon cancer.

## 2023-08-05 NOTE — ASSESSMENT
[FreeTextEntry1] : IRMA DAVIS was advised to undergo colonoscopy to which she agreed. The procedure will be performed in [facility]  [facility] with the assistance of an anesthesiologist. The patient was given a Suprep preparation prescription and understood the  procedure as it was explained to her. She was given a booklet distributed by the [employer]  [employer] explaining the procedure in detail and she understood the risks of the procedure not limited to infection, bleeding, perforation or non- diagnosis of colorectal cancer. She was advised that she could not drive home, if she chooses to  receive sedation.  Further diagnostic and treatment recommendations will be based upon the procedure and any biopsies, if they are taken.  Thank you for allowing me to participate in this [facility].  , Best personal regards -- Don  She is in need of cardiology clearance prior to her procedure.  I spent 48 minutes with the patient and answered all of her questions

## 2023-08-09 ENCOUNTER — APPOINTMENT (OUTPATIENT)
Dept: INTERNAL MEDICINE | Facility: CLINIC | Age: 58
End: 2023-08-09
Payer: COMMERCIAL

## 2023-08-09 VITALS
DIASTOLIC BLOOD PRESSURE: 67 MMHG | TEMPERATURE: 97.5 F | OXYGEN SATURATION: 97 % | HEIGHT: 67 IN | SYSTOLIC BLOOD PRESSURE: 108 MMHG | RESPIRATION RATE: 12 BRPM | WEIGHT: 171 LBS | BODY MASS INDEX: 26.84 KG/M2 | HEART RATE: 82 BPM

## 2023-08-09 DIAGNOSIS — R07.89 OTHER CHEST PAIN: ICD-10-CM

## 2023-08-09 PROCEDURE — 93000 ELECTROCARDIOGRAM COMPLETE: CPT

## 2023-08-09 PROCEDURE — 99214 OFFICE O/P EST MOD 30 MIN: CPT | Mod: 25

## 2023-08-10 ENCOUNTER — APPOINTMENT (OUTPATIENT)
Dept: INTERNAL MEDICINE | Facility: CLINIC | Age: 58
End: 2023-08-10
Payer: COMMERCIAL

## 2023-08-10 DIAGNOSIS — R06.09 OTHER FORMS OF DYSPNEA: ICD-10-CM

## 2023-08-10 PROCEDURE — 99213 OFFICE O/P EST LOW 20 MIN: CPT | Mod: 95

## 2023-08-15 LAB
ALBUMIN SERPL ELPH-MCNC: 4.8 G/DL
ALP BLD-CCNC: 98 U/L
ALT SERPL-CCNC: 22 U/L
ANION GAP SERPL CALC-SCNC: 10 MMOL/L
AST SERPL-CCNC: 23 U/L
BILIRUB SERPL-MCNC: 1.2 MG/DL
BUN SERPL-MCNC: 20 MG/DL
CALCIUM SERPL-MCNC: 10.5 MG/DL
CHLORIDE SERPL-SCNC: 102 MMOL/L
CO2 SERPL-SCNC: 29 MMOL/L
CREAT SERPL-MCNC: 0.79 MG/DL
DEPRECATED D DIMER PPP IA-ACNC: <150 NG/ML DDU
EGFR: 88 ML/MIN/1.73M2
GLUCOSE SERPL-MCNC: 103 MG/DL
POTASSIUM SERPL-SCNC: 4.5 MMOL/L
PROT SERPL-MCNC: 7.5 G/DL
SODIUM SERPL-SCNC: 141 MMOL/L
TROPONIN-T, HIGH SENSITIVITY: 7 NG/L

## 2023-08-31 NOTE — HISTORY OF PRESENT ILLNESS
[de-identified] : 57  year old female presents for evaluation of CP started last night 2 am . CP mid sternal associated with RT shoulder pain. CP Persistent 5> 2/ + worse with inspiration/ + associated SOB with laying down . She denies URI symptoms, palpitation, dizziness

## 2023-08-31 NOTE — PLAN
[FreeTextEntry1] : 1. Chest pain see plan  EKG : NSR , unchanged spoke w pt cardiologist > pt will follow up with him tomorrow

## 2023-08-31 NOTE — REVIEW OF SYSTEMS
[Chest Pain] : chest pain [Palpitations] : no palpitations [Orthopnea] : no orthopnea [Negative] : Heme/Lymph

## 2023-08-31 NOTE — PHYSICAL EXAM
[No Acute Distress] : no acute distress [Normal Sclera/Conjunctiva] : normal sclera/conjunctiva [EOMI] : extraocular movements intact [Normal Outer Ear/Nose] : the outer ears and nose were normal in appearance [Normal Oropharynx] : the oropharynx was normal [No JVD] : no jugular venous distention [No Lymphadenopathy] : no lymphadenopathy [Supple] : supple [Thyroid Normal, No Nodules] : the thyroid was normal and there were no nodules present [No Respiratory Distress] : no respiratory distress  [No Accessory Muscle Use] : no accessory muscle use [Clear to Auscultation] : lungs were clear to auscultation bilaterally [Normal Rate] : normal rate  [Regular Rhythm] : with a regular rhythm [Normal S1, S2] : normal S1 and S2 [No Carotid Bruits] : no carotid bruits [No Varicosities] : no varicosities [Pedal Pulses Present] : the pedal pulses are present [No Edema] : there was no peripheral edema [No Extremity Clubbing/Cyanosis] : no extremity clubbing/cyanosis [Soft] : abdomen soft [Non Tender] : non-tender [Non-distended] : non-distended [No Masses] : no abdominal mass palpated [Normal Bowel Sounds] : normal bowel sounds [Normal Posterior Cervical Nodes] : no posterior cervical lymphadenopathy [Normal Anterior Cervical Nodes] : no anterior cervical lymphadenopathy [No CVA Tenderness] : no CVA  tenderness [No Spinal Tenderness] : no spinal tenderness [No Joint Swelling] : no joint swelling [Grossly Normal Strength/Tone] : grossly normal strength/tone [No Rash] : no rash [Coordination Grossly Intact] : coordination grossly intact [No Focal Deficits] : no focal deficits [Normal Gait] : normal gait [Normal Affect] : the affect was normal [Normal Insight/Judgement] : insight and judgment were intact [de-identified] : +LCW PPM / + HATTIE

## 2023-10-02 RX ORDER — GABAPENTIN 300 MG/1
300 CAPSULE ORAL
Qty: 90 | Refills: 0 | Status: ACTIVE | COMMUNITY
Start: 2018-03-20 | End: 1900-01-01

## 2023-10-09 ENCOUNTER — APPOINTMENT (OUTPATIENT)
Dept: ELECTROPHYSIOLOGY | Facility: CLINIC | Age: 58
End: 2023-10-09
Payer: COMMERCIAL

## 2023-10-09 ENCOUNTER — NON-APPOINTMENT (OUTPATIENT)
Age: 58
End: 2023-10-09

## 2023-10-09 PROCEDURE — 93294 REM INTERROG EVL PM/LDLS PM: CPT

## 2023-10-09 PROCEDURE — 93296 REM INTERROG EVL PM/IDS: CPT

## 2023-10-18 ENCOUNTER — RESULT REVIEW (OUTPATIENT)
Age: 58
End: 2023-10-18

## 2023-10-18 ENCOUNTER — APPOINTMENT (OUTPATIENT)
Dept: GASTROENTEROLOGY | Facility: AMBULATORY SURGERY CENTER | Age: 58
End: 2023-10-18
Payer: COMMERCIAL

## 2023-10-18 PROCEDURE — 45380 COLONOSCOPY AND BIOPSY: CPT

## 2024-01-08 ENCOUNTER — APPOINTMENT (OUTPATIENT)
Dept: ELECTROPHYSIOLOGY | Facility: CLINIC | Age: 59
End: 2024-01-08

## 2024-01-24 PROBLEM — R06.09 DOE (DYSPNEA ON EXERTION): Status: ACTIVE | Noted: 2024-01-24

## 2024-01-24 NOTE — ASSESSMENT
[FreeTextEntry1] : Chest pain, atypical/dyspnea on exertion D-dimer negative/troponin negative Cardio follow-up Continue metoprolol

## 2024-01-24 NOTE — HISTORY OF PRESENT ILLNESS
[Home] : at home, [unfilled] , at the time of the visit. [Medical Office: ([facility])___] : at the medical office located in  [de-identified] : 57 years old female presents for follow-up on chest pain/SOB and lab results. Patient reports feeling better.  D-dimer negative/troponins.  She has an appointment to follow-up with her cardiologist.

## 2024-02-06 ENCOUNTER — APPOINTMENT (OUTPATIENT)
Dept: ELECTROPHYSIOLOGY | Facility: CLINIC | Age: 59
End: 2024-02-06

## 2024-03-06 ENCOUNTER — APPOINTMENT (OUTPATIENT)
Dept: ELECTROPHYSIOLOGY | Facility: CLINIC | Age: 59
End: 2024-03-06

## 2024-10-15 NOTE — ED ADULT NURSE NOTE - CAS DISCH ACCOMP BY
A:   L 2nd, 3rd digit wound  R leg ulcer    P:   Patient evaluated and chart reviewed  Leukocytosis absent, vitals stable   B/L wounds with no clinical signs of acute infection  R leg dressed with Santyl/DSD  L toe wounds dressed with Betadine/DSD   Non-palpable pulses B/L - recommend vascular consult   Podiatry to follow while in house  Discussed with attending Dr. Lerner      Spouse/Self

## 2025-08-18 ENCOUNTER — NON-APPOINTMENT (OUTPATIENT)
Age: 60
End: 2025-08-18

## 2025-08-19 ENCOUNTER — APPOINTMENT (OUTPATIENT)
Dept: INTERNAL MEDICINE | Facility: CLINIC | Age: 60
End: 2025-08-19
Payer: COMMERCIAL

## 2025-08-19 VITALS
BODY MASS INDEX: 29.03 KG/M2 | SYSTOLIC BLOOD PRESSURE: 102 MMHG | RESPIRATION RATE: 12 BRPM | WEIGHT: 185 LBS | HEART RATE: 77 BPM | DIASTOLIC BLOOD PRESSURE: 70 MMHG | HEIGHT: 67 IN | OXYGEN SATURATION: 97 %

## 2025-08-19 DIAGNOSIS — Z00.00 ENCOUNTER FOR GENERAL ADULT MEDICAL EXAMINATION W/OUT ABNORMAL FINDINGS: ICD-10-CM

## 2025-08-19 DIAGNOSIS — I48.0 PAROXYSMAL ATRIAL FIBRILLATION: ICD-10-CM

## 2025-08-19 DIAGNOSIS — Z13.820 ENCOUNTER FOR SCREENING FOR OSTEOPOROSIS: ICD-10-CM

## 2025-08-19 PROCEDURE — 99396 PREV VISIT EST AGE 40-64: CPT

## 2025-08-20 LAB
25(OH)D3 SERPL-MCNC: 84 NG/ML
ALBUMIN SERPL ELPH-MCNC: 4.7 G/DL
ALP BLD-CCNC: 116 U/L
ALT SERPL-CCNC: 49 U/L
ANION GAP SERPL CALC-SCNC: 19 MMOL/L
APPEARANCE: CLEAR
AST SERPL-CCNC: 34 U/L
BASOPHILS # BLD AUTO: 0.02 K/UL
BASOPHILS NFR BLD AUTO: 0.4 %
BILIRUB SERPL-MCNC: 0.8 MG/DL
BILIRUBIN URINE: NEGATIVE
BLOOD URINE: NEGATIVE
BUN SERPL-MCNC: 14 MG/DL
CALCIUM SERPL-MCNC: 10 MG/DL
CHLORIDE SERPL-SCNC: 101 MMOL/L
CHOLEST SERPL-MCNC: 173 MG/DL
CO2 SERPL-SCNC: 23 MMOL/L
COLOR: YELLOW
CREAT SERPL-MCNC: 0.74 MG/DL
EGFRCR SERPLBLD CKD-EPI 2021: 94 ML/MIN/1.73M2
EOSINOPHIL # BLD AUTO: 0.11 K/UL
EOSINOPHIL NFR BLD AUTO: 2.3 %
ESTIMATED AVERAGE GLUCOSE: 131 MG/DL
GLUCOSE QUALITATIVE U: NEGATIVE MG/DL
GLUCOSE SERPL-MCNC: 62 MG/DL
HBA1C MFR BLD HPLC: 6.2 %
HCT VFR BLD CALC: 39.9 %
HDLC SERPL-MCNC: 54 MG/DL
HGB BLD-MCNC: 12.7 G/DL
IMM GRANULOCYTES NFR BLD AUTO: 0.6 %
KETONES URINE: NEGATIVE MG/DL
LDLC SERPL-MCNC: 96 MG/DL
LEUKOCYTE ESTERASE URINE: NEGATIVE
LYMPHOCYTES # BLD AUTO: 1.62 K/UL
LYMPHOCYTES NFR BLD AUTO: 34.5 %
MAN DIFF?: NORMAL
MCHC RBC-ENTMCNC: 28.1 PG
MCHC RBC-ENTMCNC: 31.8 G/DL
MCV RBC AUTO: 88.3 FL
MONOCYTES # BLD AUTO: 0.42 K/UL
MONOCYTES NFR BLD AUTO: 8.9 %
NEUTROPHILS # BLD AUTO: 2.5 K/UL
NEUTROPHILS NFR BLD AUTO: 53.3 %
NITRITE URINE: NEGATIVE
NONHDLC SERPL-MCNC: 119 MG/DL
PH URINE: 6
PLATELET # BLD AUTO: 197 K/UL
POTASSIUM SERPL-SCNC: 3.9 MMOL/L
PROT SERPL-MCNC: 7.1 G/DL
PROTEIN URINE: NEGATIVE MG/DL
RBC # BLD: 4.52 M/UL
RBC # FLD: 13.9 %
SODIUM SERPL-SCNC: 143 MMOL/L
SPECIFIC GRAVITY URINE: 1.01
TRIGL SERPL-MCNC: 133 MG/DL
TSH SERPL-ACNC: 3.62 UIU/ML
UROBILINOGEN URINE: 0.2 MG/DL
VIT B12 SERPL-MCNC: 444 PG/ML
WBC # FLD AUTO: 4.7 K/UL

## 2025-09-12 ENCOUNTER — APPOINTMENT (OUTPATIENT)
Dept: RADIOLOGY | Facility: CLINIC | Age: 60
End: 2025-09-12